# Patient Record
Sex: MALE | Race: OTHER | NOT HISPANIC OR LATINO | URBAN - METROPOLITAN AREA
[De-identification: names, ages, dates, MRNs, and addresses within clinical notes are randomized per-mention and may not be internally consistent; named-entity substitution may affect disease eponyms.]

---

## 2023-11-07 ENCOUNTER — INPATIENT (INPATIENT)
Facility: HOSPITAL | Age: 63
LOS: 6 days | Discharge: HOME | DRG: 330 | End: 2023-11-14
Attending: STUDENT IN AN ORGANIZED HEALTH CARE EDUCATION/TRAINING PROGRAM | Admitting: SURGERY
Payer: MEDICAID

## 2023-11-07 VITALS
DIASTOLIC BLOOD PRESSURE: 92 MMHG | OXYGEN SATURATION: 97 % | WEIGHT: 220.02 LBS | RESPIRATION RATE: 18 BRPM | HEART RATE: 114 BPM | TEMPERATURE: 98 F | SYSTOLIC BLOOD PRESSURE: 153 MMHG

## 2023-11-07 DIAGNOSIS — C18.5 MALIGNANT NEOPLASM OF SPLENIC FLEXURE: ICD-10-CM

## 2023-11-07 DIAGNOSIS — Z91.148 PATIENT'S OTHER NONCOMPLIANCE WITH MEDICATION REGIMEN FOR OTHER REASON: ICD-10-CM

## 2023-11-07 DIAGNOSIS — Q54.1 HYPOSPADIAS, PENILE: ICD-10-CM

## 2023-11-07 DIAGNOSIS — E78.5 HYPERLIPIDEMIA, UNSPECIFIED: ICD-10-CM

## 2023-11-07 DIAGNOSIS — E66.01 MORBID (SEVERE) OBESITY DUE TO EXCESS CALORIES: ICD-10-CM

## 2023-11-07 DIAGNOSIS — I25.10 ATHEROSCLEROTIC HEART DISEASE OF NATIVE CORONARY ARTERY WITHOUT ANGINA PECTORIS: ICD-10-CM

## 2023-11-07 DIAGNOSIS — E11.9 TYPE 2 DIABETES MELLITUS WITHOUT COMPLICATIONS: ICD-10-CM

## 2023-11-07 DIAGNOSIS — C18.7 MALIGNANT NEOPLASM OF SIGMOID COLON: ICD-10-CM

## 2023-11-07 DIAGNOSIS — Z87.891 PERSONAL HISTORY OF NICOTINE DEPENDENCE: ICD-10-CM

## 2023-11-07 DIAGNOSIS — I10 ESSENTIAL (PRIMARY) HYPERTENSION: ICD-10-CM

## 2023-11-07 DIAGNOSIS — R10.9 UNSPECIFIED ABDOMINAL PAIN: ICD-10-CM

## 2023-11-07 DIAGNOSIS — T46.1X6A UNDERDOSING OF CALCIUM-CHANNEL BLOCKERS, INITIAL ENCOUNTER: ICD-10-CM

## 2023-11-07 PROBLEM — C18.9 MALIGNANT NEOPLASM OF COLON, UNSPECIFIED: Chronic | Status: ACTIVE | Noted: 2023-10-25

## 2023-11-07 LAB
ALBUMIN SERPL ELPH-MCNC: 3.5 G/DL — SIGNIFICANT CHANGE UP (ref 3.3–5)
ALBUMIN SERPL ELPH-MCNC: 3.5 G/DL — SIGNIFICANT CHANGE UP (ref 3.3–5)
ALBUMIN SERPL ELPH-MCNC: 4.2 G/DL — SIGNIFICANT CHANGE UP (ref 3.3–5)
ALBUMIN SERPL ELPH-MCNC: 4.2 G/DL — SIGNIFICANT CHANGE UP (ref 3.3–5)
ALP SERPL-CCNC: 82 U/L — SIGNIFICANT CHANGE UP (ref 40–120)
ALP SERPL-CCNC: 82 U/L — SIGNIFICANT CHANGE UP (ref 40–120)
ALP SERPL-CCNC: 99 U/L — SIGNIFICANT CHANGE UP (ref 40–120)
ALP SERPL-CCNC: 99 U/L — SIGNIFICANT CHANGE UP (ref 40–120)
ALT FLD-CCNC: 20 U/L — SIGNIFICANT CHANGE UP (ref 10–45)
ALT FLD-CCNC: 20 U/L — SIGNIFICANT CHANGE UP (ref 10–45)
ALT FLD-CCNC: SIGNIFICANT CHANGE UP U/L (ref 10–45)
ALT FLD-CCNC: SIGNIFICANT CHANGE UP U/L (ref 10–45)
ANION GAP SERPL CALC-SCNC: 10 MMOL/L — SIGNIFICANT CHANGE UP (ref 5–17)
ANION GAP SERPL CALC-SCNC: 10 MMOL/L — SIGNIFICANT CHANGE UP (ref 5–17)
ANION GAP SERPL CALC-SCNC: 12 MMOL/L — SIGNIFICANT CHANGE UP (ref 5–17)
ANION GAP SERPL CALC-SCNC: 12 MMOL/L — SIGNIFICANT CHANGE UP (ref 5–17)
ANISOCYTOSIS BLD QL: SLIGHT — SIGNIFICANT CHANGE UP
ANISOCYTOSIS BLD QL: SLIGHT — SIGNIFICANT CHANGE UP
AST SERPL-CCNC: 19 U/L — SIGNIFICANT CHANGE UP (ref 10–40)
AST SERPL-CCNC: 19 U/L — SIGNIFICANT CHANGE UP (ref 10–40)
AST SERPL-CCNC: SIGNIFICANT CHANGE UP U/L (ref 10–40)
AST SERPL-CCNC: SIGNIFICANT CHANGE UP U/L (ref 10–40)
BASOPHILS # BLD AUTO: 0.1 K/UL — SIGNIFICANT CHANGE UP (ref 0–0.2)
BASOPHILS # BLD AUTO: 0.1 K/UL — SIGNIFICANT CHANGE UP (ref 0–0.2)
BASOPHILS NFR BLD AUTO: 0.9 % — SIGNIFICANT CHANGE UP (ref 0–2)
BASOPHILS NFR BLD AUTO: 0.9 % — SIGNIFICANT CHANGE UP (ref 0–2)
BILIRUB SERPL-MCNC: 0.2 MG/DL — SIGNIFICANT CHANGE UP (ref 0.2–1.2)
BLD GP AB SCN SERPL QL: NEGATIVE — SIGNIFICANT CHANGE UP
BLD GP AB SCN SERPL QL: NEGATIVE — SIGNIFICANT CHANGE UP
BUN SERPL-MCNC: 10 MG/DL — SIGNIFICANT CHANGE UP (ref 7–23)
BUN SERPL-MCNC: 10 MG/DL — SIGNIFICANT CHANGE UP (ref 7–23)
BUN SERPL-MCNC: 11 MG/DL — SIGNIFICANT CHANGE UP (ref 7–23)
BUN SERPL-MCNC: 11 MG/DL — SIGNIFICANT CHANGE UP (ref 7–23)
BURR CELLS BLD QL SMEAR: PRESENT — SIGNIFICANT CHANGE UP
BURR CELLS BLD QL SMEAR: PRESENT — SIGNIFICANT CHANGE UP
CALCIUM SERPL-MCNC: 8.1 MG/DL — LOW (ref 8.4–10.5)
CALCIUM SERPL-MCNC: 8.1 MG/DL — LOW (ref 8.4–10.5)
CALCIUM SERPL-MCNC: 9.4 MG/DL — SIGNIFICANT CHANGE UP (ref 8.4–10.5)
CALCIUM SERPL-MCNC: 9.4 MG/DL — SIGNIFICANT CHANGE UP (ref 8.4–10.5)
CHLORIDE SERPL-SCNC: 102 MMOL/L — SIGNIFICANT CHANGE UP (ref 96–108)
CHLORIDE SERPL-SCNC: 102 MMOL/L — SIGNIFICANT CHANGE UP (ref 96–108)
CHLORIDE SERPL-SCNC: 107 MMOL/L — SIGNIFICANT CHANGE UP (ref 96–108)
CHLORIDE SERPL-SCNC: 107 MMOL/L — SIGNIFICANT CHANGE UP (ref 96–108)
CO2 SERPL-SCNC: 21 MMOL/L — LOW (ref 22–31)
CO2 SERPL-SCNC: 21 MMOL/L — LOW (ref 22–31)
CO2 SERPL-SCNC: 23 MMOL/L — SIGNIFICANT CHANGE UP (ref 22–31)
CO2 SERPL-SCNC: 23 MMOL/L — SIGNIFICANT CHANGE UP (ref 22–31)
CREAT SERPL-MCNC: 0.95 MG/DL — SIGNIFICANT CHANGE UP (ref 0.5–1.3)
CREAT SERPL-MCNC: 0.95 MG/DL — SIGNIFICANT CHANGE UP (ref 0.5–1.3)
CREAT SERPL-MCNC: 1.01 MG/DL — SIGNIFICANT CHANGE UP (ref 0.5–1.3)
CREAT SERPL-MCNC: 1.01 MG/DL — SIGNIFICANT CHANGE UP (ref 0.5–1.3)
EGFR: 84 ML/MIN/1.73M2 — SIGNIFICANT CHANGE UP
EGFR: 84 ML/MIN/1.73M2 — SIGNIFICANT CHANGE UP
EGFR: 90 ML/MIN/1.73M2 — SIGNIFICANT CHANGE UP
EGFR: 90 ML/MIN/1.73M2 — SIGNIFICANT CHANGE UP
ELLIPTOCYTES BLD QL SMEAR: SLIGHT — SIGNIFICANT CHANGE UP
ELLIPTOCYTES BLD QL SMEAR: SLIGHT — SIGNIFICANT CHANGE UP
EOSINOPHIL # BLD AUTO: 0 K/UL — SIGNIFICANT CHANGE UP (ref 0–0.5)
EOSINOPHIL # BLD AUTO: 0 K/UL — SIGNIFICANT CHANGE UP (ref 0–0.5)
EOSINOPHIL NFR BLD AUTO: 0 % — SIGNIFICANT CHANGE UP (ref 0–6)
EOSINOPHIL NFR BLD AUTO: 0 % — SIGNIFICANT CHANGE UP (ref 0–6)
GLUCOSE BLDC GLUCOMTR-MCNC: 109 MG/DL — HIGH (ref 70–99)
GLUCOSE BLDC GLUCOMTR-MCNC: 109 MG/DL — HIGH (ref 70–99)
GLUCOSE BLDC GLUCOMTR-MCNC: 97 MG/DL — SIGNIFICANT CHANGE UP (ref 70–99)
GLUCOSE BLDC GLUCOMTR-MCNC: 97 MG/DL — SIGNIFICANT CHANGE UP (ref 70–99)
GLUCOSE SERPL-MCNC: 105 MG/DL — HIGH (ref 70–99)
GLUCOSE SERPL-MCNC: 105 MG/DL — HIGH (ref 70–99)
GLUCOSE SERPL-MCNC: 122 MG/DL — HIGH (ref 70–99)
GLUCOSE SERPL-MCNC: 122 MG/DL — HIGH (ref 70–99)
HCT VFR BLD CALC: 46 % — SIGNIFICANT CHANGE UP (ref 39–50)
HCT VFR BLD CALC: 46 % — SIGNIFICANT CHANGE UP (ref 39–50)
HGB BLD-MCNC: 14.3 G/DL — SIGNIFICANT CHANGE UP (ref 13–17)
HGB BLD-MCNC: 14.3 G/DL — SIGNIFICANT CHANGE UP (ref 13–17)
LIDOCAIN IGE QN: 27 U/L — SIGNIFICANT CHANGE UP (ref 7–60)
LIDOCAIN IGE QN: 27 U/L — SIGNIFICANT CHANGE UP (ref 7–60)
LYMPHOCYTES # BLD AUTO: 19.5 % — SIGNIFICANT CHANGE UP (ref 13–44)
LYMPHOCYTES # BLD AUTO: 19.5 % — SIGNIFICANT CHANGE UP (ref 13–44)
LYMPHOCYTES # BLD AUTO: 2.14 K/UL — SIGNIFICANT CHANGE UP (ref 1–3.3)
LYMPHOCYTES # BLD AUTO: 2.14 K/UL — SIGNIFICANT CHANGE UP (ref 1–3.3)
MAGNESIUM SERPL-MCNC: 2.1 MG/DL — SIGNIFICANT CHANGE UP (ref 1.6–2.6)
MAGNESIUM SERPL-MCNC: 2.1 MG/DL — SIGNIFICANT CHANGE UP (ref 1.6–2.6)
MANUAL SMEAR VERIFICATION: SIGNIFICANT CHANGE UP
MANUAL SMEAR VERIFICATION: SIGNIFICANT CHANGE UP
MCHC RBC-ENTMCNC: 25.4 PG — LOW (ref 27–34)
MCHC RBC-ENTMCNC: 25.4 PG — LOW (ref 27–34)
MCHC RBC-ENTMCNC: 31.1 GM/DL — LOW (ref 32–36)
MCHC RBC-ENTMCNC: 31.1 GM/DL — LOW (ref 32–36)
MCV RBC AUTO: 81.6 FL — SIGNIFICANT CHANGE UP (ref 80–100)
MCV RBC AUTO: 81.6 FL — SIGNIFICANT CHANGE UP (ref 80–100)
MICROCYTES BLD QL: SLIGHT — SIGNIFICANT CHANGE UP
MICROCYTES BLD QL: SLIGHT — SIGNIFICANT CHANGE UP
MONOCYTES # BLD AUTO: 0.87 K/UL — SIGNIFICANT CHANGE UP (ref 0–0.9)
MONOCYTES # BLD AUTO: 0.87 K/UL — SIGNIFICANT CHANGE UP (ref 0–0.9)
MONOCYTES NFR BLD AUTO: 7.9 % — SIGNIFICANT CHANGE UP (ref 2–14)
MONOCYTES NFR BLD AUTO: 7.9 % — SIGNIFICANT CHANGE UP (ref 2–14)
NEUTROPHILS # BLD AUTO: 7.87 K/UL — HIGH (ref 1.8–7.4)
NEUTROPHILS # BLD AUTO: 7.87 K/UL — HIGH (ref 1.8–7.4)
NEUTROPHILS NFR BLD AUTO: 71.7 % — SIGNIFICANT CHANGE UP (ref 43–77)
NEUTROPHILS NFR BLD AUTO: 71.7 % — SIGNIFICANT CHANGE UP (ref 43–77)
PLAT MORPH BLD: ABNORMAL
PLAT MORPH BLD: ABNORMAL
PLATELET # BLD AUTO: 306 K/UL — SIGNIFICANT CHANGE UP (ref 150–400)
PLATELET # BLD AUTO: 306 K/UL — SIGNIFICANT CHANGE UP (ref 150–400)
POIKILOCYTOSIS BLD QL AUTO: SIGNIFICANT CHANGE UP
POIKILOCYTOSIS BLD QL AUTO: SIGNIFICANT CHANGE UP
POTASSIUM SERPL-MCNC: 4.2 MMOL/L — SIGNIFICANT CHANGE UP (ref 3.5–5.3)
POTASSIUM SERPL-MCNC: 4.2 MMOL/L — SIGNIFICANT CHANGE UP (ref 3.5–5.3)
POTASSIUM SERPL-MCNC: SIGNIFICANT CHANGE UP MMOL/L (ref 3.5–5.3)
POTASSIUM SERPL-MCNC: SIGNIFICANT CHANGE UP MMOL/L (ref 3.5–5.3)
POTASSIUM SERPL-SCNC: 4.2 MMOL/L — SIGNIFICANT CHANGE UP (ref 3.5–5.3)
POTASSIUM SERPL-SCNC: 4.2 MMOL/L — SIGNIFICANT CHANGE UP (ref 3.5–5.3)
POTASSIUM SERPL-SCNC: SIGNIFICANT CHANGE UP MMOL/L (ref 3.5–5.3)
POTASSIUM SERPL-SCNC: SIGNIFICANT CHANGE UP MMOL/L (ref 3.5–5.3)
PROT SERPL-MCNC: 6.9 G/DL — SIGNIFICANT CHANGE UP (ref 6–8.3)
PROT SERPL-MCNC: 6.9 G/DL — SIGNIFICANT CHANGE UP (ref 6–8.3)
PROT SERPL-MCNC: 8.6 G/DL — HIGH (ref 6–8.3)
PROT SERPL-MCNC: 8.6 G/DL — HIGH (ref 6–8.3)
RBC # BLD: 5.64 M/UL — SIGNIFICANT CHANGE UP (ref 4.2–5.8)
RBC # BLD: 5.64 M/UL — SIGNIFICANT CHANGE UP (ref 4.2–5.8)
RBC # FLD: 23.4 % — HIGH (ref 10.3–14.5)
RBC # FLD: 23.4 % — HIGH (ref 10.3–14.5)
RBC BLD AUTO: ABNORMAL
RBC BLD AUTO: ABNORMAL
RH IG SCN BLD-IMP: POSITIVE — SIGNIFICANT CHANGE UP
SMUDGE CELLS # BLD: PRESENT — SIGNIFICANT CHANGE UP
SMUDGE CELLS # BLD: PRESENT — SIGNIFICANT CHANGE UP
SODIUM SERPL-SCNC: 137 MMOL/L — SIGNIFICANT CHANGE UP (ref 135–145)
SODIUM SERPL-SCNC: 137 MMOL/L — SIGNIFICANT CHANGE UP (ref 135–145)
SODIUM SERPL-SCNC: 138 MMOL/L — SIGNIFICANT CHANGE UP (ref 135–145)
SODIUM SERPL-SCNC: 138 MMOL/L — SIGNIFICANT CHANGE UP (ref 135–145)
SPHEROCYTES BLD QL SMEAR: SLIGHT — SIGNIFICANT CHANGE UP
SPHEROCYTES BLD QL SMEAR: SLIGHT — SIGNIFICANT CHANGE UP
WBC # BLD: 10.98 K/UL — HIGH (ref 3.8–10.5)
WBC # BLD: 10.98 K/UL — HIGH (ref 3.8–10.5)
WBC # FLD AUTO: 10.98 K/UL — HIGH (ref 3.8–10.5)
WBC # FLD AUTO: 10.98 K/UL — HIGH (ref 3.8–10.5)

## 2023-11-07 PROCEDURE — 71045 X-RAY EXAM CHEST 1 VIEW: CPT | Mod: 26

## 2023-11-07 PROCEDURE — 93010 ELECTROCARDIOGRAM REPORT: CPT

## 2023-11-07 PROCEDURE — 99285 EMERGENCY DEPT VISIT HI MDM: CPT

## 2023-11-07 RX ORDER — MORPHINE SULFATE 50 MG/1
4 CAPSULE, EXTENDED RELEASE ORAL ONCE
Refills: 0 | Status: DISCONTINUED | OUTPATIENT
Start: 2023-11-07 | End: 2023-11-07

## 2023-11-07 RX ORDER — DEXTROSE 50 % IN WATER 50 %
15 SYRINGE (ML) INTRAVENOUS ONCE
Refills: 0 | Status: DISCONTINUED | OUTPATIENT
Start: 2023-11-07 | End: 2023-11-09

## 2023-11-07 RX ORDER — HEPARIN SODIUM 5000 [USP'U]/ML
7500 INJECTION INTRAVENOUS; SUBCUTANEOUS EVERY 8 HOURS
Refills: 0 | Status: DISCONTINUED | OUTPATIENT
Start: 2023-11-07 | End: 2023-11-09

## 2023-11-07 RX ORDER — VITAMIN E 100 UNIT
0 CAPSULE ORAL
Refills: 0 | DISCHARGE

## 2023-11-07 RX ORDER — HYDROMORPHONE HYDROCHLORIDE 2 MG/ML
0.25 INJECTION INTRAMUSCULAR; INTRAVENOUS; SUBCUTANEOUS EVERY 4 HOURS
Refills: 0 | Status: DISCONTINUED | OUTPATIENT
Start: 2023-11-07 | End: 2023-11-09

## 2023-11-07 RX ORDER — INSULIN LISPRO 100/ML
VIAL (ML) SUBCUTANEOUS
Refills: 0 | Status: DISCONTINUED | OUTPATIENT
Start: 2023-11-07 | End: 2023-11-09

## 2023-11-07 RX ORDER — SODIUM CHLORIDE 9 MG/ML
1000 INJECTION, SOLUTION INTRAVENOUS
Refills: 0 | Status: DISCONTINUED | OUTPATIENT
Start: 2023-11-07 | End: 2023-11-09

## 2023-11-07 RX ORDER — ONDANSETRON 8 MG/1
4 TABLET, FILM COATED ORAL ONCE
Refills: 0 | Status: COMPLETED | OUTPATIENT
Start: 2023-11-07 | End: 2023-11-07

## 2023-11-07 RX ORDER — HYDROMORPHONE HYDROCHLORIDE 2 MG/ML
0.5 INJECTION INTRAMUSCULAR; INTRAVENOUS; SUBCUTANEOUS EVERY 4 HOURS
Refills: 0 | Status: DISCONTINUED | OUTPATIENT
Start: 2023-11-07 | End: 2023-11-09

## 2023-11-07 RX ORDER — DEXTROSE 50 % IN WATER 50 %
25 SYRINGE (ML) INTRAVENOUS ONCE
Refills: 0 | Status: DISCONTINUED | OUTPATIENT
Start: 2023-11-07 | End: 2023-11-09

## 2023-11-07 RX ORDER — DEXTROSE 50 % IN WATER 50 %
12.5 SYRINGE (ML) INTRAVENOUS ONCE
Refills: 0 | Status: DISCONTINUED | OUTPATIENT
Start: 2023-11-07 | End: 2023-11-09

## 2023-11-07 RX ORDER — ACETAMINOPHEN 500 MG
650 TABLET ORAL EVERY 6 HOURS
Refills: 0 | Status: DISCONTINUED | OUTPATIENT
Start: 2023-11-07 | End: 2023-11-09

## 2023-11-07 RX ORDER — IOHEXOL 300 MG/ML
30 INJECTION, SOLUTION INTRAVENOUS ONCE
Refills: 0 | Status: COMPLETED | OUTPATIENT
Start: 2023-11-07 | End: 2023-11-07

## 2023-11-07 RX ORDER — GLUCAGON INJECTION, SOLUTION 0.5 MG/.1ML
1 INJECTION, SOLUTION SUBCUTANEOUS ONCE
Refills: 0 | Status: DISCONTINUED | OUTPATIENT
Start: 2023-11-07 | End: 2023-11-09

## 2023-11-07 RX ORDER — ONDANSETRON 8 MG/1
4 TABLET, FILM COATED ORAL EVERY 6 HOURS
Refills: 0 | Status: DISCONTINUED | OUTPATIENT
Start: 2023-11-07 | End: 2023-11-09

## 2023-11-07 RX ORDER — INFLUENZA VIRUS VACCINE 15; 15; 15; 15 UG/.5ML; UG/.5ML; UG/.5ML; UG/.5ML
0.5 SUSPENSION INTRAMUSCULAR ONCE
Refills: 0 | Status: DISCONTINUED | OUTPATIENT
Start: 2023-11-07 | End: 2023-11-14

## 2023-11-07 RX ORDER — PANTOPRAZOLE SODIUM 20 MG/1
40 TABLET, DELAYED RELEASE ORAL DAILY
Refills: 0 | Status: DISCONTINUED | OUTPATIENT
Start: 2023-11-07 | End: 2023-11-09

## 2023-11-07 RX ORDER — HEPARIN SODIUM 5000 [USP'U]/ML
7500 INJECTION INTRAVENOUS; SUBCUTANEOUS EVERY 8 HOURS
Refills: 0 | Status: DISCONTINUED | OUTPATIENT
Start: 2023-11-07 | End: 2023-11-07

## 2023-11-07 RX ORDER — HEPARIN SODIUM 5000 [USP'U]/ML
7500 INJECTION INTRAVENOUS; SUBCUTANEOUS ONCE
Refills: 0 | Status: DISCONTINUED | OUTPATIENT
Start: 2023-11-07 | End: 2023-11-07

## 2023-11-07 RX ORDER — SODIUM CHLORIDE 9 MG/ML
1000 INJECTION INTRAMUSCULAR; INTRAVENOUS; SUBCUTANEOUS ONCE
Refills: 0 | Status: COMPLETED | OUTPATIENT
Start: 2023-11-07 | End: 2023-11-07

## 2023-11-07 RX ADMIN — MORPHINE SULFATE 4 MILLIGRAM(S): 50 CAPSULE, EXTENDED RELEASE ORAL at 13:24

## 2023-11-07 RX ADMIN — ONDANSETRON 4 MILLIGRAM(S): 8 TABLET, FILM COATED ORAL at 12:54

## 2023-11-07 RX ADMIN — HEPARIN SODIUM 7500 UNIT(S): 5000 INJECTION INTRAVENOUS; SUBCUTANEOUS at 21:52

## 2023-11-07 RX ADMIN — SODIUM CHLORIDE 1000 MILLILITER(S): 9 INJECTION INTRAMUSCULAR; INTRAVENOUS; SUBCUTANEOUS at 12:54

## 2023-11-07 RX ADMIN — IOHEXOL 30 MILLILITER(S): 300 INJECTION, SOLUTION INTRAVENOUS at 12:54

## 2023-11-07 RX ADMIN — MORPHINE SULFATE 4 MILLIGRAM(S): 50 CAPSULE, EXTENDED RELEASE ORAL at 12:54

## 2023-11-07 NOTE — H&P ADULT - NSHPPHYSICALEXAM_GEN_ALL_CORE
CONSTITUTIONAL: Awake, alert.  Nontoxic, no acute distress.  HEAD: Normocephalic, atraumatic.  EYES: Conjunctivae clear without exudates or hemorrhage. Sclera is non-icteric.  ENT: Normal appearing external ears, nose, mucous membranes moist.  NECK: supple, trachea midline.  HEART:  Normal rate, regular rhythm.    LUNGS:  No acute respiratory distress.  Non-tachypneic and non-labored.  ABDOMEN: soft, morbidly obese, non distended, mildly tender to palpation in left hemiabdomen, there is absence of rebound, guarding.   MUSCULOSKELETAL:  Moving all extremities without issue.  SKIN: Skin in warm, dry and intact without rashes or lesions.  Appropriate color for ethnicity.  NEUROLOGICAL:  Patient is alert, oriented x person, place and time.  PSYCH: Appropriate mood and affect. Good judgment and insight.

## 2023-11-07 NOTE — PATIENT PROFILE ADULT - HOW PATIENT ADDRESSED, PROFILE
Alana Talbot, MEGHANN, LD, [facility]     Time spent: 9 minutes  
Mr. Liao

## 2023-11-07 NOTE — H&P ADULT - ASSESSMENT
63 year old male with know history of splenic flexure and descending colon adenocarcinoma (Aug 2023 by colonoscopy), HTN, HLD, DMII, morbid obesity referred to [facility] ED by PCP for increasing abdominal pain, bloody bowel movements, increasing abdominal bloating and distension. Clinical findings, along with patient history and outpatient work up consistent with acute presentation of increasing obstructive symptoms from splenic flexure colon adenocarcinoma.     Plan     Admit to Dr. Irving [facility]   Plan for extended left hemicolectomy on 11/9 -   Clear liquid diet   SQH/ OOBA/ SCDs   Bowel prep 11/8 pending OR   AM labs ( ensure active type and screen)   Hold 2 PRBC pending procedure   Dr. Irving and Chief resident Daly Curtis aware and agree with plans

## 2023-11-07 NOTE — PATIENT PROFILE ADULT - NSTRANSFERBELONGINGSDISPO_GEN_A_NUR
Medication:   Requested Prescriptions     Pending Prescriptions Disp Refills    pregabalin (LYRICA) 25 MG capsule 90 capsule 0     Sig: Take 1 capsule by mouth 3 times daily for 30 days. Last Filled:  09/09/21    Patient Phone Number: [phone] (home)     Last appt: 8/13/2021 Return in about 4 weeks (around 9/10/2021). Next appt: Visit date not found    Last OARRS: No flowsheet data found.
with patient

## 2023-11-07 NOTE — ED ADULT NURSE NOTE - OBJECTIVE STATEMENT
Pt A&Ox4 and able to and able to speak in complete sentences. Pt breathing even and unlabored with equal chest rise and fall. Pt endorsing abd pain for the past couple months and reported work up outpatient. Pt endorsed bloody stools. Pt denies cp, n, v, lightheadedness, dizziness, sob, fevers, chills. Pt abd pain located in BLQ. Pt abd tender to palpation. Pt able to ambulate with steady gait.

## 2023-11-07 NOTE — ED ADULT NURSE NOTE - NSFALLUNIVINTERV_ED_ALL_ED
Bed/Stretcher in lowest position, wheels locked, appropriate side rails in place/Call bell, personal items and telephone in reach/Instruct patient to call for assistance before getting out of bed/chair/stretcher/Non-slip footwear applied when patient is off stretcher/[place] to call system/Physically safe environment - no spills, clutter or unnecessary equipment/Purposeful proactive rounding/Room/bathroom lighting operational, light cord in reach

## 2023-11-07 NOTE — ED PROVIDER NOTE - OBJECTIVE STATEMENT
64 yo M with PMH of CAD, HTN, HLD, DM,  with know history of splenic flexure and descending colon adenocarcinoma (Aug 2023 by colonoscopy) know to Dr. Montague, HTN, HLD, DMII ?, morbid obesity referred to [facility] ED by PCP for increasing abdominal pain, bloody bowel movements, increasing abdominal bloating and distension. Patient refers that over the past couple of days he has been experiencing crampy lower abdominal pain with increasing feeling of bloating. Refers that he was being worked up for colon malignancy after he started to notice blood on the toilet bowl after bowel movements and while wiping. Today he refers intermittent, dull lower abdominal pain not associated with nausea, vomiting, blood per rectum, melena. Denies any chest pain, shortness of breathe, cough, urinary symptoms.     In the ED patient seen in no acute distress, with complaints of mild abdominal pain.   On exam, vital signs are stable, abdomen is soft, morbidly obese, non distended, mildly tender to palpation in left hemiabdomen, there is absence of rebound, guarding.   Lab values within normal limits     PMH: descending colon adenocarcinoma, HTN, HLD, DMII ?  PSH: denies  Social: Former smoker (quit 8 years ago)  Allergies: denies	  Fam: No family history of cancer 		  Scopes: C-scope 2 months ago - dx of colon cancer   Meds:  Vit D 62 yo M with PMH of HTN, HLD, DM, CAD with known history of splenic flexure and descending colon adenocarcinoma (Aug 2023 by colonoscopy), known to Dr. Montague, referred to [facility] ED by PCP for increasing abdominal pain, bloody bowel movements, increasing abdominal bloating and distension. Patient states that over the past couple of days he has been experiencing crampy lower abdominal pain with increasing feeling of bloating. Refers that he was being worked up for colon malignancy after he started to notice blood on the toilet bowl after bowel movements and while wiping. Today he refers intermittent, dull lower abdominal pain not associated with nausea, vomiting, blood per rectum, melena. Denies any chest pain, shortness of breathe, cough, urinary symptoms.

## 2023-11-07 NOTE — H&P ADULT - NSICDXPASTMEDICALHX_GEN_ALL_CORE_FT
PAST MEDICAL HISTORY:  CAD (coronary artery disease)     DMII (diabetes mellitus, type 2)     HLD (hyperlipidemia)     HTN (hypertension)     Malignant neoplasm of colon

## 2023-11-07 NOTE — H&P ADULT - HISTORY OF PRESENT ILLNESS
63 year old male with know history of splenic flexure and descending colon adenocarcinoma (Aug 2023 by colonoscopy) know to Dr. Montague, HTN, HLD, DMII, morbid obesity referred to [facility] ED by PCP for increasing abdominal pain, bloody bowel movements, increasing abdominal bloating and distension. Patient refers that over the past couple of days he has been experiencing crampy lower abdominal pain with increasing feeling of bloating. Refers that he was being worked up for colon malignancy after he started to notice blood on the toilet bowl after bowel movements and while wiping. Today he refers intermittent, dull lower abdominal pain not associated with nausea, vomiting, blood per rectum, melena. Denies any chest pain, shortness of breathe, cough, urinary symptoms.     In the ED patient seen in no acute distress, with complaints of mild abdominal pain.   On exam, vital signs are stable, abdomen is soft, morbidly obese, non distended, mildly tender to palpation in left hemiabdomen, there is absence of rebound, guarding.   Lab values within normal limits     PMH: descending colon adenocarcinoma, HTN, HLD, CAD, DMII  PSH: denies  Social: Former smoker (quit 8 years ago)  Allergies: denies	  Fam: No family history of cancer 		  Scopes: C-scope 2 months ago - dx of colon cancer   Meds:  Vit D  63 year old male with know history of splenic flexure and descending colon adenocarcinoma (Aug 2023 by colonoscopy) know to Dr. Montague, HTN, HLD, DMII ?, morbid obesity referred to [facility] ED by PCP for increasing abdominal pain, bloody bowel movements, increasing abdominal bloating and distension. Patient refers that over the past couple of days he has been experiencing crampy lower abdominal pain with increasing feeling of bloating. Refers that he was being worked up for colon malignancy after he started to notice blood on the toilet bowl after bowel movements and while wiping. Today he refers intermittent, dull lower abdominal pain not associated with nausea, vomiting, blood per rectum, melena. Denies any chest pain, shortness of breathe, cough, urinary symptoms.     In the ED patient seen in no acute distress, with complaints of mild abdominal pain.   On exam, vital signs are stable, abdomen is soft, morbidly obese, non distended, mildly tender to palpation in left hemiabdomen, there is absence of rebound, guarding.   Lab values within normal limits     PMH: descending colon adenocarcinoma, HTN, HLD, DMII ?  PSH: denies  Social: Former smoker (quit 8 years ago)  Allergies: denies	  Fam: No family history of cancer 		  Scopes: C-scope 2 months ago - dx of colon cancer   Meds:  Vit D

## 2023-11-07 NOTE — ED PROVIDER NOTE - CLINICAL SUMMARY MEDICAL DECISION MAKING FREE TEXT BOX
63 year old male with know history of splenic flexure and descending colon adenocarcinoma (Aug 2023 by colonoscopy) know to Dr. Montague, HTN, HLD, DMII ?, morbid obesity referred to [facility] ED by PCP for increasing abdominal pain, bloody bowel movements, increasing abdominal bloating and distension. Patient refers that over the past couple of days he has been experiencing crampy lower abdominal pain with increasing feeling of bloating. Refers that he was being worked up for colon malignancy after he started to notice blood on the toilet bowl after bowel movements and while wiping. Today he refers intermittent, dull lower abdominal pain not associated with nausea, vomiting, blood per rectum, melena. Denies any chest pain, shortness of breathe, cough, urinary symptoms.     In the ED patient seen in no acute distress, with complaints of mild abdominal pain.   On exam, vital signs are stable, abdomen is soft, morbidly obese, non distended, mildly tender to palpation in left hemiabdomen, there is absence of rebound, guarding.   Lab values within normal limits     PMH: descending colon adenocarcinoma, HTN, HLD, DMII ?  PSH: denies  Social: Former smoker (quit 8 years ago)  Allergies: denies	  Fam: No family history of cancer 		  Scopes: C-scope 2 months ago - dx of colon cancer   Meds:  Vit D 62 yo M with PMH of HTN, HLD, DM, CAD with known history of splenic flexure and descending colon adenocarcinoma (Aug 2023 by colonoscopy), known to Dr. Montague, referred to [facility] ED by PCP for increasing abdominal pain, bloody bowel movements, increasing abdominal bloating and distension. Patient states that over the past couple of days he has been experiencing crampy lower abdominal pain with increasing feeling of bloating. Refers that he was being worked up for colon malignancy after he started to notice blood on the toilet bowl after bowel movements and while wiping. Today he refers intermittent, dull lower abdominal pain not associated with nausea, vomiting, blood per rectum, melena. Denies any chest pain, shortness of breathe, cough, urinary symptoms.    ED course: VS noted. Pt afebrile, tachycardic and mildly hypertensive. Abdomen is soft and NT. Labs noted and unremarkable. Case discussed with Surgery and pt admitted to Surgery. Stable in ED. 62 yo M with PMH of HTN, HLD, DM, CAD with known history of splenic flexure and descending colon adenocarcinoma (Aug 2023 by colonoscopy), known to Dr. Montague, referred to [facility] ED by PCP for increasing abdominal pain, bloody bowel movements, increasing abdominal bloating and distension. Patient states that over the past couple of days he has been experiencing crampy lower abdominal pain with increasing feeling of bloating. Refers that he was being worked up for colon malignancy after he started to notice blood on the toilet bowl after bowel movements and while wiping. Today he refers intermittent, dull lower abdominal pain not associated with nausea, vomiting, blood per rectum, melena. Denies any chest pain, shortness of breathe, cough, urinary symptoms.    ED course: VS noted. Pt afebrile, tachycardic and mildly hypertensive. Abdomen is soft and NT. Labs noted. Case discussed with Surgery and patient admitted to Surgery. Stable in ED.

## 2023-11-07 NOTE — H&P ADULT - NSHPLABSRESULTS_GEN_ALL_CORE
LABS:                        14.3   10.98 )-----------( 306      ( 07 Nov 2023 12:13 )             46.0     11-07    137  |  102  |  11  ----------------------------<  122<H>  SEE NOTE   |  23  |  1.01    Ca    9.4      07 Nov 2023 12:13  Mg     2.1     11-07    TPro  8.6<H>  /  Alb  4.2  /  TBili  0.2  /  DBili  x   /  AST  SEE NOTE  /  ALT  SEE NOTE  /  AlkPhos  99  11-07      Urinalysis Basic - ( 07 Nov 2023 12:13 )    Color: x / Appearance: x / SG: x / pH: x  Gluc: 122 mg/dL / Ketone: x  / Bili: x / Urobili: x   Blood: x / Protein: x / Nitrite: x   Leuk Esterase: x / RBC: x / WBC x   Sq Epi: x / Non Sq Epi: x / Bacteria: x        RADIOLOGY AND ADDITIONAL TESTS: Reviewed

## 2023-11-07 NOTE — ED PROVIDER NOTE - NS ED MD DISPO DIVISION
Aortic diameter per CT   11/9/19 Annulus 4.0cm sinuses 4.5cm STJ 3.7cm transverse arch 3.5cm    8/18/21 asc 4.3cm    11/22/22  asc 3.8cm mArch 3.3cm pDesc 2.8cm dDesc 2.7cm                           Message to Pt, RF's requested too soon. Is she out of medications for some reason? Awaiting reply.   [facility]

## 2023-11-08 LAB
ANION GAP SERPL CALC-SCNC: 10 MMOL/L — SIGNIFICANT CHANGE UP (ref 5–17)
ANION GAP SERPL CALC-SCNC: 10 MMOL/L — SIGNIFICANT CHANGE UP (ref 5–17)
BUN SERPL-MCNC: 8 MG/DL — SIGNIFICANT CHANGE UP (ref 7–23)
BUN SERPL-MCNC: 8 MG/DL — SIGNIFICANT CHANGE UP (ref 7–23)
CALCIUM SERPL-MCNC: 8.4 MG/DL — SIGNIFICANT CHANGE UP (ref 8.4–10.5)
CALCIUM SERPL-MCNC: 8.4 MG/DL — SIGNIFICANT CHANGE UP (ref 8.4–10.5)
CHLORIDE SERPL-SCNC: 107 MMOL/L — SIGNIFICANT CHANGE UP (ref 96–108)
CHLORIDE SERPL-SCNC: 107 MMOL/L — SIGNIFICANT CHANGE UP (ref 96–108)
CO2 SERPL-SCNC: 23 MMOL/L — SIGNIFICANT CHANGE UP (ref 22–31)
CO2 SERPL-SCNC: 23 MMOL/L — SIGNIFICANT CHANGE UP (ref 22–31)
CREAT SERPL-MCNC: 1 MG/DL — SIGNIFICANT CHANGE UP (ref 0.5–1.3)
CREAT SERPL-MCNC: 1 MG/DL — SIGNIFICANT CHANGE UP (ref 0.5–1.3)
EGFR: 85 ML/MIN/1.73M2 — SIGNIFICANT CHANGE UP
EGFR: 85 ML/MIN/1.73M2 — SIGNIFICANT CHANGE UP
GLUCOSE BLDC GLUCOMTR-MCNC: 109 MG/DL — HIGH (ref 70–99)
GLUCOSE BLDC GLUCOMTR-MCNC: 109 MG/DL — HIGH (ref 70–99)
GLUCOSE BLDC GLUCOMTR-MCNC: 91 MG/DL — SIGNIFICANT CHANGE UP (ref 70–99)
GLUCOSE BLDC GLUCOMTR-MCNC: 91 MG/DL — SIGNIFICANT CHANGE UP (ref 70–99)
GLUCOSE BLDC GLUCOMTR-MCNC: 98 MG/DL — SIGNIFICANT CHANGE UP (ref 70–99)
GLUCOSE SERPL-MCNC: 107 MG/DL — HIGH (ref 70–99)
GLUCOSE SERPL-MCNC: 107 MG/DL — HIGH (ref 70–99)
HCT VFR BLD CALC: 40.1 % — SIGNIFICANT CHANGE UP (ref 39–50)
HCT VFR BLD CALC: 40.1 % — SIGNIFICANT CHANGE UP (ref 39–50)
HGB BLD-MCNC: 12.6 G/DL — LOW (ref 13–17)
HGB BLD-MCNC: 12.6 G/DL — LOW (ref 13–17)
MAGNESIUM SERPL-MCNC: 2.4 MG/DL — SIGNIFICANT CHANGE UP (ref 1.6–2.6)
MAGNESIUM SERPL-MCNC: 2.4 MG/DL — SIGNIFICANT CHANGE UP (ref 1.6–2.6)
MCHC RBC-ENTMCNC: 25.9 PG — LOW (ref 27–34)
MCHC RBC-ENTMCNC: 25.9 PG — LOW (ref 27–34)
MCHC RBC-ENTMCNC: 31.4 GM/DL — LOW (ref 32–36)
MCHC RBC-ENTMCNC: 31.4 GM/DL — LOW (ref 32–36)
MCV RBC AUTO: 82.3 FL — SIGNIFICANT CHANGE UP (ref 80–100)
MCV RBC AUTO: 82.3 FL — SIGNIFICANT CHANGE UP (ref 80–100)
NRBC # BLD: 0 /100 WBCS — SIGNIFICANT CHANGE UP (ref 0–0)
NRBC # BLD: 0 /100 WBCS — SIGNIFICANT CHANGE UP (ref 0–0)
PHOSPHATE SERPL-MCNC: 3.4 MG/DL — SIGNIFICANT CHANGE UP (ref 2.5–4.5)
PHOSPHATE SERPL-MCNC: 3.4 MG/DL — SIGNIFICANT CHANGE UP (ref 2.5–4.5)
PLATELET # BLD AUTO: 282 K/UL — SIGNIFICANT CHANGE UP (ref 150–400)
PLATELET # BLD AUTO: 282 K/UL — SIGNIFICANT CHANGE UP (ref 150–400)
POTASSIUM SERPL-MCNC: 4.2 MMOL/L — SIGNIFICANT CHANGE UP (ref 3.5–5.3)
POTASSIUM SERPL-MCNC: 4.2 MMOL/L — SIGNIFICANT CHANGE UP (ref 3.5–5.3)
POTASSIUM SERPL-SCNC: 4.2 MMOL/L — SIGNIFICANT CHANGE UP (ref 3.5–5.3)
POTASSIUM SERPL-SCNC: 4.2 MMOL/L — SIGNIFICANT CHANGE UP (ref 3.5–5.3)
RBC # BLD: 4.87 M/UL — SIGNIFICANT CHANGE UP (ref 4.2–5.8)
RBC # BLD: 4.87 M/UL — SIGNIFICANT CHANGE UP (ref 4.2–5.8)
RBC # FLD: 23.1 % — HIGH (ref 10.3–14.5)
RBC # FLD: 23.1 % — HIGH (ref 10.3–14.5)
SODIUM SERPL-SCNC: 140 MMOL/L — SIGNIFICANT CHANGE UP (ref 135–145)
SODIUM SERPL-SCNC: 140 MMOL/L — SIGNIFICANT CHANGE UP (ref 135–145)
WBC # BLD: 7.6 K/UL — SIGNIFICANT CHANGE UP (ref 3.8–10.5)
WBC # BLD: 7.6 K/UL — SIGNIFICANT CHANGE UP (ref 3.8–10.5)
WBC # FLD AUTO: 7.6 K/UL — SIGNIFICANT CHANGE UP (ref 3.8–10.5)
WBC # FLD AUTO: 7.6 K/UL — SIGNIFICANT CHANGE UP (ref 3.8–10.5)

## 2023-11-08 RX ORDER — SOD SULF/SODIUM/NAHCO3/KCL/PEG
4000 SOLUTION, RECONSTITUTED, ORAL ORAL ONCE
Refills: 0 | Status: COMPLETED | OUTPATIENT
Start: 2023-11-08 | End: 2023-11-08

## 2023-11-08 RX ORDER — SODIUM CHLORIDE 9 MG/ML
1000 INJECTION, SOLUTION INTRAVENOUS
Refills: 0 | Status: DISCONTINUED | OUTPATIENT
Start: 2023-11-08 | End: 2023-11-09

## 2023-11-08 RX ORDER — NEOMYCIN SULFATE 500 MG/1
1000 TABLET ORAL
Refills: 0 | Status: COMPLETED | OUTPATIENT
Start: 2023-11-08 | End: 2023-11-08

## 2023-11-08 RX ORDER — METRONIDAZOLE 500 MG
500 TABLET ORAL
Refills: 0 | Status: COMPLETED | OUTPATIENT
Start: 2023-11-08 | End: 2023-11-08

## 2023-11-08 RX ADMIN — Medication 500 MILLIGRAM(S): at 18:47

## 2023-11-08 RX ADMIN — HEPARIN SODIUM 7500 UNIT(S): 5000 INJECTION INTRAVENOUS; SUBCUTANEOUS at 21:48

## 2023-11-08 RX ADMIN — Medication 500 MILLIGRAM(S): at 21:46

## 2023-11-08 RX ADMIN — Medication 10 MILLIGRAM(S): at 12:51

## 2023-11-08 RX ADMIN — Medication 500 MILLIGRAM(S): at 16:40

## 2023-11-08 RX ADMIN — Medication 10 MILLIGRAM(S): at 16:09

## 2023-11-08 RX ADMIN — NEOMYCIN SULFATE 1000 MILLIGRAM(S): 500 TABLET ORAL at 21:45

## 2023-11-08 RX ADMIN — PANTOPRAZOLE SODIUM 40 MILLIGRAM(S): 20 TABLET, DELAYED RELEASE ORAL at 12:51

## 2023-11-08 RX ADMIN — NEOMYCIN SULFATE 1000 MILLIGRAM(S): 500 TABLET ORAL at 18:48

## 2023-11-08 RX ADMIN — HEPARIN SODIUM 7500 UNIT(S): 5000 INJECTION INTRAVENOUS; SUBCUTANEOUS at 06:16

## 2023-11-08 RX ADMIN — Medication 4000 MILLILITER(S): at 12:52

## 2023-11-08 RX ADMIN — NEOMYCIN SULFATE 1000 MILLIGRAM(S): 500 TABLET ORAL at 16:08

## 2023-11-08 RX ADMIN — HEPARIN SODIUM 7500 UNIT(S): 5000 INJECTION INTRAVENOUS; SUBCUTANEOUS at 16:08

## 2023-11-08 NOTE — PROGRESS NOTE ADULT - ASSESSMENT
62yo male with known hx of splenic flexure and descending colon adenocarcinoma (8/2023 by colonoscopy), HTN, HLD, DMII, morbid obesity referred to [facility] ED by PCP for increasing abdominal pain, bloody bowel movements, increasing abdominal bloating and distension. Patient refers that over the past couple of days he has been experiencing crampy lower abdominal pain with increasing feeling of bloating. States he was being worked up for colon malignancy after he started to notice blood on the toilet after bowel movements and while wiping. Today he refers intermittent, dull lower abdominal pain not associated with nausea, vomiting, blood per rectum, melena. Plan for OR 11/9      Bowel prep for OR 11/9  CLD/IVF  SQH/SCDs  OOB  am labs

## 2023-11-08 NOTE — CONSULT NOTE ADULT - ASSESSMENT
63 year old male with know history of splenic flexure and descending colon adenocarcinoma (Aug 2023 by colonoscopy) know to Dr. Montague, HTN, HLD, DMII, morbid obesity .....  presented after evaluation by PCP due to complaints of dyspepsia, hematochezia, and abdominal pain with bloating; patient was in process of colon cancer evaluation. Now has c/o abdominal pain, BRBPR, hematochezia persists         Home Medications:  vitamin E d-alpha 400 intl units oral capsule: orally (07 Nov 2023 13:55)   63 year old male with know history of splenic flexure and descending colon adenocarcinoma (Aug 2023 by colonoscopy) know to Dr. Montague, HTN morbid obesity presented after evaluation by PCP due to complaints of dyspepsia, hematochezia, and abdominal pain with bloating; patient was in process of colon cancer evaluation. Now has c/o abdominal pain, BRBPR, hematochezia persists     patient non compliant with anti HTN medications, if needed can place on norvasc with holding parameters post op   CXR unremarkable, labs/ EKG reviewed  patient with METS > 4, no SOB / CP with moderate daily activities   patient is a call II RCI score risk     63 year old male with know history of splenic flexure and descending colon adenocarcinoma (Aug 2023 by colonoscopy) know to Dr. Montague, HTN morbid obesity presented after evaluation by PCP due to complaints of dyspepsia, hematochezia, and abdominal pain with bloating; patient was in process of colon cancer evaluation. Now has c/o abdominal pain, BRBPR, hematochezia persists     patient non compliant with anti HTN medications, if needed can place on norvasc with holding parameters post op   CXR unremarkable, labs/ EKG reviewed  patient with METS > 4, no SOB / CP with moderate daily activities   [place] cardiac risk of 0.2%   patient is a class III RCI score risk, 2 points for elevated risk surgery, Q Waves present on EKG

## 2023-11-08 NOTE — CONSULT NOTE ADULT - SUBJECTIVE AND OBJECTIVE BOX
HPI: HPI:  63 year old male with know history of splenic flexure and descending colon adenocarcinoma (Aug 2023 by colonoscopy) know to Dr. Montague, HTN, HLD, DMII, morbid obesity .....  presented after evaluation by PCP due to complaints of dyspepsia, hematochezia, and abdominal pain with bloating; patient was in process of colon cancer evaluation. Now has c/o abdominal pain, BRBPR, hematochezia persists       Denies any chest pain, shortness of breathe, cough, urinary symptoms.         PMH: descending colon adenocarcinoma, HTN, HLD, CAD, DMII  PSH: denies  Social: Former smoker (quit 8 years ago)  Allergies: denies	  Fam: No family history of cancer 		  Scopes: C-scope 2 months ago - dx of colon cancer   Meds:  Vit D  (07 Nov 2023 13:54)        PAST MEDICAL & SURGICAL HISTORY:  Malignant neoplasm of colon      HTN (hypertension)      DMII (diabetes mellitus, type 2)      CAD (coronary artery disease)      HLD (hyperlipidemia)      No significant past surgical history          Allergies    No Known Allergies    Intolerances        MEDICATIONS  (STANDING):  bisacodyl Suppository 10 milliGRAM(s) Rectal once  dextrose 5%. 1000 milliLiter(s) (50 mL/Hr) IV Continuous <Continuous>  dextrose 5%. 1000 milliLiter(s) (100 mL/Hr) IV Continuous <Continuous>  dextrose 50% Injectable 12.5 Gram(s) IV Push once  dextrose 50% Injectable 25 Gram(s) IV Push once  dextrose 50% Injectable 25 Gram(s) IV Push once  glucagon  Injectable 1 milliGRAM(s) IntraMuscular once  heparin   Injectable 7500 Unit(s) SubCutaneous every 8 hours  influenza   Vaccine 0.5 milliLiter(s) IntraMuscular once  insulin lispro (ADMELOG) corrective regimen sliding scale   SubCutaneous Before meals and at bedtime  lactated ringers. 1000 milliLiter(s) (120 mL/Hr) IV Continuous <Continuous>  metroNIDAZOLE    Tablet 500 milliGRAM(s) Oral <User Schedule>  neomycin 1000 milliGRAM(s) Oral <User Schedule>  pantoprazole  Injectable 40 milliGRAM(s) IV Push daily    MEDICATIONS  (PRN):  acetaminophen     Tablet .. 650 milliGRAM(s) Oral every 6 hours PRN Mild Pain (1 - 3)  dextrose Oral Gel 15 Gram(s) Oral once PRN Blood Glucose LESS THAN 70 milliGRAM(s)/deciliter  HYDROmorphone  Injectable 0.5 milliGRAM(s) IV Push every 4 hours PRN Severe Pain (7 - 10)  HYDROmorphone  Injectable 0.25 milliGRAM(s) IV Push every 4 hours PRN Moderate Pain (4 - 6)  ondansetron Injectable 4 milliGRAM(s) IV Push every 6 hours PRN Nausea      SOCIAL HISTORY:    FAMILY HISTORY:  No pertinent family history in first degree relatives          Vital Signs Last 24 Hrs  T(C): 36.6 (08 Nov 2023 13:56), Max: 36.9 (07 Nov 2023 16:15)  T(F): 97.9 (08 Nov 2023 13:56), Max: 98.4 (07 Nov 2023 16:15)  HR: 76 (08 Nov 2023 13:56) (63 - 88)  BP: 149/78 (08 Nov 2023 13:56) (133/75 - 162/83)  BP(mean): --  RR: 17 (08 Nov 2023 13:56) (14 - 18)  SpO2: 97% (08 Nov 2023 13:56) (95% - 98%)    Parameters below as of 08 Nov 2023 13:56  Patient On (Oxygen Delivery Method): room air        I&O's Summary    07 Nov 2023 07:01  -  08 Nov 2023 07:00  --------------------------------------------------------  IN: 0 mL / OUT: 650 mL / NET: -650 mL    08 Nov 2023 07:01  -  08 Nov 2023 14:28  --------------------------------------------------------  IN: 230 mL / OUT: 0 mL / NET: 230 mL        LABS:                        12.6   7.60  )-----------( 282      ( 08 Nov 2023 05:30 )             40.1     11-08    140  |  107  |  8   ----------------------------<  107<H>  4.2   |  23  |  1.00    Ca    8.4      08 Nov 2023 05:30  Phos  3.4     11-08  Mg     2.4     11-08    TPro  6.9  /  Alb  3.5  /  TBili  0.2  /  DBili  x   /  AST  19  /  ALT  20  /  AlkPhos  82  11-07    LIVER FUNCTIONS - ( 07 Nov 2023 13:48 )  Alb: 3.5 g/dL / Pro: 6.9 g/dL / ALK PHOS: 82 U/L / ALT: 20 U/L / AST: 19 U/L / GGT: x           PT/INR - ( 07 Nov 2023 12:13 )   PT: 11.8 sec;   INR: 1.04          PTT - ( 07 Nov 2023 12:13 )  PTT:29.3 sec  Urinalysis Basic - ( 08 Nov 2023 05:30 )    Color: x / Appearance: x / SG: x / pH: x  Gluc: 107 mg/dL / Ketone: x  / Bili: x / Urobili: x   Blood: x / Protein: x / Nitrite: x   Leuk Esterase: x / RBC: x / WBC x   Sq Epi: x / Non Sq Epi: x / Bacteria: x      CAPILLARY BLOOD GLUCOSE      POCT Blood Glucose.: 98 mg/dL (08 Nov 2023 11:44)  POCT Blood Glucose.: 91 mg/dL (08 Nov 2023 06:07)  POCT Blood Glucose.: 109 mg/dL (07 Nov 2023 21:33)  POCT Blood Glucose.: 97 mg/dL (07 Nov 2023 18:02)      Cultures:      PHYSICAL EXAM:  General: NAD, resting comfortably  HEENT: NC/AT, EOMI, normal hearing, no oral lesions, no LAD, neck supple  Pulmonary: Normal resp effort, CTA-B  Cardiovascular: NSR, no murmurs  Abdominal: Soft, ND/NT, no organomegaly  Groin: Soft, nontender, no ecchymosis/hematoma, no erythema, no edema.  Extremities: (+) DP/PT pulses. FROM, normal strength, no clubbing/cyanosis/erythema/edema  Neuro: A/O x 3, CNs II-XII grossly intact, normal sensation, no focal deficits  Pulses: Palpable distal pulses    RADIOLOGY & ADDITIONAL STUDIES:         HPI: HPI:  63 year old male with know history of splenic flexure and descending colon adenocarcinoma (Aug 2023 by colonoscopy) know to Dr. Montague, HTN, HLD, DMII, morbid obesity presented after evaluation by PCP due to complaints of dyspepsia, hematochezia, and abdominal pain with bloating; patient was in process of colon cancer evaluation. Now has c/o abdominal pain, BRBPR, hematochezia persists, denies current N/V. Patient denies history of HLD DMII or CAD       ALL 12 SYSTEMS Reviewed and negative except for HPI      PMH: descending colon adenocarcinoma, HTN  PSH: denies  Social: Former smoker (quit 8 years ago)  Allergies: denies	  Fam: No family history of cancer 		  Scopes: C-scope 2 months ago - dx of colon cancer   Meds:  Vit D  (07 Nov 2023 13:54)        PAST MEDICAL & SURGICAL HISTORY:  Malignant neoplasm of colon      HTN (hypertension)      DMII (diabetes mellitus, type 2)      CAD (coronary artery disease)      HLD (hyperlipidemia)      No significant past surgical history          Allergies    No Known Allergies    Intolerances        MEDICATIONS  (STANDING):  bisacodyl Suppository 10 milliGRAM(s) Rectal once  dextrose 5%. 1000 milliLiter(s) (50 mL/Hr) IV Continuous <Continuous>  dextrose 5%. 1000 milliLiter(s) (100 mL/Hr) IV Continuous <Continuous>  dextrose 50% Injectable 12.5 Gram(s) IV Push once  dextrose 50% Injectable 25 Gram(s) IV Push once  dextrose 50% Injectable 25 Gram(s) IV Push once  glucagon  Injectable 1 milliGRAM(s) IntraMuscular once  heparin   Injectable 7500 Unit(s) SubCutaneous every 8 hours  influenza   Vaccine 0.5 milliLiter(s) IntraMuscular once  insulin lispro (ADMELOG) corrective regimen sliding scale   SubCutaneous Before meals and at bedtime  lactated ringers. 1000 milliLiter(s) (120 mL/Hr) IV Continuous <Continuous>  metroNIDAZOLE    Tablet 500 milliGRAM(s) Oral <User Schedule>  neomycin 1000 milliGRAM(s) Oral <User Schedule>  pantoprazole  Injectable 40 milliGRAM(s) IV Push daily    MEDICATIONS  (PRN):  acetaminophen     Tablet .. 650 milliGRAM(s) Oral every 6 hours PRN Mild Pain (1 - 3)  dextrose Oral Gel 15 Gram(s) Oral once PRN Blood Glucose LESS THAN 70 milliGRAM(s)/deciliter  HYDROmorphone  Injectable 0.5 milliGRAM(s) IV Push every 4 hours PRN Severe Pain (7 - 10)  HYDROmorphone  Injectable 0.25 milliGRAM(s) IV Push every 4 hours PRN Moderate Pain (4 - 6)  ondansetron Injectable 4 milliGRAM(s) IV Push every 6 hours PRN Nausea      SOCIAL HISTORY:    FAMILY HISTORY:  No pertinent family history in first degree relatives          Vital Signs Last 24 Hrs  T(C): 36.6 (08 Nov 2023 13:56), Max: 36.9 (07 Nov 2023 16:15)  T(F): 97.9 (08 Nov 2023 13:56), Max: 98.4 (07 Nov 2023 16:15)  HR: 76 (08 Nov 2023 13:56) (63 - 88)  BP: 149/78 (08 Nov 2023 13:56) (133/75 - 162/83)  BP(mean): --  RR: 17 (08 Nov 2023 13:56) (14 - 18)  SpO2: 97% (08 Nov 2023 13:56) (95% - 98%)    Parameters below as of 08 Nov 2023 13:56  Patient On (Oxygen Delivery Method): room air        I&O's Summary    07 Nov 2023 07:01  -  08 Nov 2023 07:00  --------------------------------------------------------  IN: 0 mL / OUT: 650 mL / NET: -650 mL    08 Nov 2023 07:01  -  08 Nov 2023 14:28  --------------------------------------------------------  IN: 230 mL / OUT: 0 mL / NET: 230 mL        LABS:                        12.6   7.60  )-----------( 282      ( 08 Nov 2023 05:30 )             40.1     11-08    140  |  107  |  8   ----------------------------<  107<H>  4.2   |  23  |  1.00    Ca    8.4      08 Nov 2023 05:30  Phos  3.4     11-08  Mg     2.4     11-08    TPro  6.9  /  Alb  3.5  /  TBili  0.2  /  DBili  x   /  AST  19  /  ALT  20  /  AlkPhos  82  11-07    LIVER FUNCTIONS - ( 07 Nov 2023 13:48 )  Alb: 3.5 g/dL / Pro: 6.9 g/dL / ALK PHOS: 82 U/L / ALT: 20 U/L / AST: 19 U/L / GGT: x           PT/INR - ( 07 Nov 2023 12:13 )   PT: 11.8 sec;   INR: 1.04          PTT - ( 07 Nov 2023 12:13 )  PTT:29.3 sec  Urinalysis Basic - ( 08 Nov 2023 05:30 )    Color: x / Appearance: x / SG: x / pH: x  Gluc: 107 mg/dL / Ketone: x  / Bili: x / Urobili: x   Blood: x / Protein: x / Nitrite: x   Leuk Esterase: x / RBC: x / WBC x   Sq Epi: x / Non Sq Epi: x / Bacteria: x      CAPILLARY BLOOD GLUCOSE      POCT Blood Glucose.: 98 mg/dL (08 Nov 2023 11:44)  POCT Blood Glucose.: 91 mg/dL (08 Nov 2023 06:07)  POCT Blood Glucose.: 109 mg/dL (07 Nov 2023 21:33)  POCT Blood Glucose.: 97 mg/dL (07 Nov 2023 18:02)      Cultures:      PHYSICAL EXAM:  General: NAD, resting comfortably  HEENT: NC/AT, EOMI, normal hearing, no oral lesions, no LAD, neck supple  Pulmonary: Normal resp effort, CTA-B  Cardiovascular: NSR, no murmurs  Abdominal: Soft, ND/NT, no organomegaly  Groin: Soft, nontender, no ecchymosis/hematoma, no erythema, no edema.  Extremities: (+) DP/PT pulses. FROM, normal strength, no clubbing/cyanosis/erythema/edema  Neuro: A/O x 3, CNs II-XII grossly intact, normal sensation, no focal deficits  Pulses: Palpable distal pulses    RADIOLOGY & ADDITIONAL STUDIES:

## 2023-11-08 NOTE — PROGRESS NOTE ADULT - SUBJECTIVE AND OBJECTIVE BOX
SUBJECTIVE: Pt seen and examined at bedside. No acute complaints. Pt denies N/V, pt states he has not passed gas or had bowel movement.       MEDICATIONS  (STANDING):  dextrose 5%. 1000 milliLiter(s) (50 mL/Hr) IV Continuous <Continuous>  dextrose 5%. 1000 milliLiter(s) (100 mL/Hr) IV Continuous <Continuous>  dextrose 50% Injectable 12.5 Gram(s) IV Push once  dextrose 50% Injectable 25 Gram(s) IV Push once  dextrose 50% Injectable 25 Gram(s) IV Push once  glucagon  Injectable 1 milliGRAM(s) IntraMuscular once  heparin   Injectable 7500 Unit(s) SubCutaneous every 8 hours  influenza   Vaccine 0.5 milliLiter(s) IntraMuscular once  insulin lispro (ADMELOG) corrective regimen sliding scale   SubCutaneous Before meals and at bedtime  pantoprazole  Injectable 40 milliGRAM(s) IV Push daily    MEDICATIONS  (PRN):  acetaminophen     Tablet .. 650 milliGRAM(s) Oral every 6 hours PRN Mild Pain (1 - 3)  dextrose Oral Gel 15 Gram(s) Oral once PRN Blood Glucose LESS THAN 70 milliGRAM(s)/deciliter  HYDROmorphone  Injectable 0.5 milliGRAM(s) IV Push every 4 hours PRN Severe Pain (7 - 10)  HYDROmorphone  Injectable 0.25 milliGRAM(s) IV Push every 4 hours PRN Moderate Pain (4 - 6)  ondansetron Injectable 4 milliGRAM(s) IV Push every 6 hours PRN Nausea      Vital Signs Last 24 Hrs  T(C): 36.7 (08 Nov 2023 04:52), Max: 36.9 (07 Nov 2023 16:15)  T(F): 98.1 (08 Nov 2023 04:52), Max: 98.4 (07 Nov 2023 16:15)  HR: 65 (08 Nov 2023 04:52) (63 - 114)  BP: 133/76 (08 Nov 2023 04:52) (133/75 - 162/83)  BP(mean): --  RR: 18 (08 Nov 2023 04:52) (16 - 18)  SpO2: 98% (08 Nov 2023 04:52) (95% - 98%)    Parameters below as of 08 Nov 2023 04:52  Patient On (Oxygen Delivery Method): room air        PHYSICAL EXAM:  General: NAD, pt resting comfortably in bed  Pulm: No respiratory distress, nonlabored breathing, on room air  CVS: NSR, HDS  Abd: Soft, ND, mildly tender in LLQ  Extremities: WWP, no edema                  I&O's Detail    07 Nov 2023 07:01  -  08 Nov 2023 07:00  --------------------------------------------------------  IN:  Total IN: 0 mL    OUT:    Voided (mL): 650 mL  Total OUT: 650 mL    Total NET: -650 mL          LABS:                        12.6   7.60  )-----------( 282      ( 08 Nov 2023 05:30 )             40.1     11-08    140  |  107  |  8   ----------------------------<  107<H>  4.2   |  23  |  1.00    Ca    8.4      08 Nov 2023 05:30  Phos  3.4     11-08  Mg     2.4     11-08    TPro  6.9  /  Alb  3.5  /  TBili  0.2  /  DBili  x   /  AST  19  /  ALT  20  /  AlkPhos  82  11-07    PT/INR - ( 07 Nov 2023 12:13 )   PT: 11.8 sec;   INR: 1.04          PTT - ( 07 Nov 2023 12:13 )  PTT:29.3 sec  Urinalysis Basic - ( 08 Nov 2023 05:30 )    Color: x / Appearance: x / SG: x / pH: x  Gluc: 107 mg/dL / Ketone: x  / Bili: x / Urobili: x   Blood: x / Protein: x / Nitrite: x   Leuk Esterase: x / RBC: x / WBC x   Sq Epi: x / Non Sq Epi: x / Bacteria: x        RADIOLOGY & ADDITIONAL STUDIES:

## 2023-11-09 LAB
ANION GAP SERPL CALC-SCNC: 11 MMOL/L — SIGNIFICANT CHANGE UP (ref 5–17)
ANION GAP SERPL CALC-SCNC: 11 MMOL/L — SIGNIFICANT CHANGE UP (ref 5–17)
ANION GAP SERPL CALC-SCNC: 12 MMOL/L — SIGNIFICANT CHANGE UP (ref 5–17)
ANION GAP SERPL CALC-SCNC: 12 MMOL/L — SIGNIFICANT CHANGE UP (ref 5–17)
BUN SERPL-MCNC: 8 MG/DL — SIGNIFICANT CHANGE UP (ref 7–23)
CALCIUM SERPL-MCNC: 8.1 MG/DL — LOW (ref 8.4–10.5)
CALCIUM SERPL-MCNC: 8.1 MG/DL — LOW (ref 8.4–10.5)
CALCIUM SERPL-MCNC: 8.6 MG/DL — SIGNIFICANT CHANGE UP (ref 8.4–10.5)
CALCIUM SERPL-MCNC: 8.6 MG/DL — SIGNIFICANT CHANGE UP (ref 8.4–10.5)
CHLORIDE SERPL-SCNC: 104 MMOL/L — SIGNIFICANT CHANGE UP (ref 96–108)
CHLORIDE SERPL-SCNC: 104 MMOL/L — SIGNIFICANT CHANGE UP (ref 96–108)
CHLORIDE SERPL-SCNC: 106 MMOL/L — SIGNIFICANT CHANGE UP (ref 96–108)
CHLORIDE SERPL-SCNC: 106 MMOL/L — SIGNIFICANT CHANGE UP (ref 96–108)
CO2 SERPL-SCNC: 21 MMOL/L — LOW (ref 22–31)
CO2 SERPL-SCNC: 21 MMOL/L — LOW (ref 22–31)
CO2 SERPL-SCNC: 25 MMOL/L — SIGNIFICANT CHANGE UP (ref 22–31)
CO2 SERPL-SCNC: 25 MMOL/L — SIGNIFICANT CHANGE UP (ref 22–31)
CREAT SERPL-MCNC: 1.1 MG/DL — SIGNIFICANT CHANGE UP (ref 0.5–1.3)
CREAT SERPL-MCNC: 1.1 MG/DL — SIGNIFICANT CHANGE UP (ref 0.5–1.3)
CREAT SERPL-MCNC: 1.16 MG/DL — SIGNIFICANT CHANGE UP (ref 0.5–1.3)
CREAT SERPL-MCNC: 1.16 MG/DL — SIGNIFICANT CHANGE UP (ref 0.5–1.3)
EGFR: 71 ML/MIN/1.73M2 — SIGNIFICANT CHANGE UP
EGFR: 71 ML/MIN/1.73M2 — SIGNIFICANT CHANGE UP
EGFR: 75 ML/MIN/1.73M2 — SIGNIFICANT CHANGE UP
EGFR: 75 ML/MIN/1.73M2 — SIGNIFICANT CHANGE UP
GLUCOSE BLDC GLUCOMTR-MCNC: 136 MG/DL — HIGH (ref 70–99)
GLUCOSE BLDC GLUCOMTR-MCNC: 136 MG/DL — HIGH (ref 70–99)
GLUCOSE BLDC GLUCOMTR-MCNC: 92 MG/DL — SIGNIFICANT CHANGE UP (ref 70–99)
GLUCOSE BLDC GLUCOMTR-MCNC: 92 MG/DL — SIGNIFICANT CHANGE UP (ref 70–99)
GLUCOSE SERPL-MCNC: 158 MG/DL — HIGH (ref 70–99)
GLUCOSE SERPL-MCNC: 158 MG/DL — HIGH (ref 70–99)
GLUCOSE SERPL-MCNC: 83 MG/DL — SIGNIFICANT CHANGE UP (ref 70–99)
GLUCOSE SERPL-MCNC: 83 MG/DL — SIGNIFICANT CHANGE UP (ref 70–99)
HCT VFR BLD CALC: 37.9 % — LOW (ref 39–50)
HCT VFR BLD CALC: 37.9 % — LOW (ref 39–50)
HCT VFR BLD CALC: 40.3 % — SIGNIFICANT CHANGE UP (ref 39–50)
HCT VFR BLD CALC: 40.3 % — SIGNIFICANT CHANGE UP (ref 39–50)
HGB BLD-MCNC: 12 G/DL — LOW (ref 13–17)
HGB BLD-MCNC: 12 G/DL — LOW (ref 13–17)
HGB BLD-MCNC: 12.6 G/DL — LOW (ref 13–17)
HGB BLD-MCNC: 12.6 G/DL — LOW (ref 13–17)
MAGNESIUM SERPL-MCNC: 2.3 MG/DL — SIGNIFICANT CHANGE UP (ref 1.6–2.6)
MAGNESIUM SERPL-MCNC: 2.3 MG/DL — SIGNIFICANT CHANGE UP (ref 1.6–2.6)
MCHC RBC-ENTMCNC: 25.9 PG — LOW (ref 27–34)
MCHC RBC-ENTMCNC: 25.9 PG — LOW (ref 27–34)
MCHC RBC-ENTMCNC: 26.1 PG — LOW (ref 27–34)
MCHC RBC-ENTMCNC: 26.1 PG — LOW (ref 27–34)
MCHC RBC-ENTMCNC: 31.3 GM/DL — LOW (ref 32–36)
MCHC RBC-ENTMCNC: 31.3 GM/DL — LOW (ref 32–36)
MCHC RBC-ENTMCNC: 31.7 GM/DL — LOW (ref 32–36)
MCHC RBC-ENTMCNC: 31.7 GM/DL — LOW (ref 32–36)
MCV RBC AUTO: 81.9 FL — SIGNIFICANT CHANGE UP (ref 80–100)
MCV RBC AUTO: 81.9 FL — SIGNIFICANT CHANGE UP (ref 80–100)
MCV RBC AUTO: 83.4 FL — SIGNIFICANT CHANGE UP (ref 80–100)
MCV RBC AUTO: 83.4 FL — SIGNIFICANT CHANGE UP (ref 80–100)
NRBC # BLD: 0 /100 WBCS — SIGNIFICANT CHANGE UP (ref 0–0)
PHOSPHATE SERPL-MCNC: 3.4 MG/DL — SIGNIFICANT CHANGE UP (ref 2.5–4.5)
PHOSPHATE SERPL-MCNC: 3.4 MG/DL — SIGNIFICANT CHANGE UP (ref 2.5–4.5)
PLATELET # BLD AUTO: 268 K/UL — SIGNIFICANT CHANGE UP (ref 150–400)
PLATELET # BLD AUTO: 268 K/UL — SIGNIFICANT CHANGE UP (ref 150–400)
PLATELET # BLD AUTO: 281 K/UL — SIGNIFICANT CHANGE UP (ref 150–400)
PLATELET # BLD AUTO: 281 K/UL — SIGNIFICANT CHANGE UP (ref 150–400)
POTASSIUM SERPL-MCNC: 3.8 MMOL/L — SIGNIFICANT CHANGE UP (ref 3.5–5.3)
POTASSIUM SERPL-MCNC: 3.8 MMOL/L — SIGNIFICANT CHANGE UP (ref 3.5–5.3)
POTASSIUM SERPL-MCNC: 4 MMOL/L — SIGNIFICANT CHANGE UP (ref 3.5–5.3)
POTASSIUM SERPL-MCNC: 4 MMOL/L — SIGNIFICANT CHANGE UP (ref 3.5–5.3)
POTASSIUM SERPL-SCNC: 3.8 MMOL/L — SIGNIFICANT CHANGE UP (ref 3.5–5.3)
POTASSIUM SERPL-SCNC: 3.8 MMOL/L — SIGNIFICANT CHANGE UP (ref 3.5–5.3)
POTASSIUM SERPL-SCNC: 4 MMOL/L — SIGNIFICANT CHANGE UP (ref 3.5–5.3)
POTASSIUM SERPL-SCNC: 4 MMOL/L — SIGNIFICANT CHANGE UP (ref 3.5–5.3)
RBC # BLD: 4.63 M/UL — SIGNIFICANT CHANGE UP (ref 4.2–5.8)
RBC # BLD: 4.63 M/UL — SIGNIFICANT CHANGE UP (ref 4.2–5.8)
RBC # BLD: 4.83 M/UL — SIGNIFICANT CHANGE UP (ref 4.2–5.8)
RBC # BLD: 4.83 M/UL — SIGNIFICANT CHANGE UP (ref 4.2–5.8)
RBC # FLD: 22.5 % — HIGH (ref 10.3–14.5)
RBC # FLD: 22.5 % — HIGH (ref 10.3–14.5)
RBC # FLD: 23 % — HIGH (ref 10.3–14.5)
RBC # FLD: 23 % — HIGH (ref 10.3–14.5)
SODIUM SERPL-SCNC: 137 MMOL/L — SIGNIFICANT CHANGE UP (ref 135–145)
SODIUM SERPL-SCNC: 137 MMOL/L — SIGNIFICANT CHANGE UP (ref 135–145)
SODIUM SERPL-SCNC: 142 MMOL/L — SIGNIFICANT CHANGE UP (ref 135–145)
SODIUM SERPL-SCNC: 142 MMOL/L — SIGNIFICANT CHANGE UP (ref 135–145)
WBC # BLD: 19.24 K/UL — HIGH (ref 3.8–10.5)
WBC # BLD: 19.24 K/UL — HIGH (ref 3.8–10.5)
WBC # BLD: 8.22 K/UL — SIGNIFICANT CHANGE UP (ref 3.8–10.5)
WBC # BLD: 8.22 K/UL — SIGNIFICANT CHANGE UP (ref 3.8–10.5)
WBC # FLD AUTO: 19.24 K/UL — HIGH (ref 3.8–10.5)
WBC # FLD AUTO: 19.24 K/UL — HIGH (ref 3.8–10.5)
WBC # FLD AUTO: 8.22 K/UL — SIGNIFICANT CHANGE UP (ref 3.8–10.5)
WBC # FLD AUTO: 8.22 K/UL — SIGNIFICANT CHANGE UP (ref 3.8–10.5)

## 2023-11-09 PROCEDURE — 88342 IMHCHEM/IMCYTCHM 1ST ANTB: CPT | Mod: 26

## 2023-11-09 PROCEDURE — 88341 IMHCHEM/IMCYTCHM EA ADD ANTB: CPT | Mod: 26

## 2023-11-09 PROCEDURE — 88309 TISSUE EXAM BY PATHOLOGIST: CPT | Mod: 26

## 2023-11-09 PROCEDURE — 88304 TISSUE EXAM BY PATHOLOGIST: CPT | Mod: 26

## 2023-11-09 DEVICE — STAPLER COVIDIEN PURSTRING 65MM: Type: IMPLANTABLE DEVICE | Site: LEFT | Status: FUNCTIONAL

## 2023-11-09 DEVICE — STAPLER ETHICON PROXIMATE 75MM WITH BLUE RELOAD: Type: IMPLANTABLE DEVICE | Site: LEFT | Status: FUNCTIONAL

## 2023-11-09 DEVICE — LIGATING CLIPS WECK HEMOLOK POLYMER MEDIUM-LARGE (GREEN) 6: Type: IMPLANTABLE DEVICE | Site: LEFT | Status: FUNCTIONAL

## 2023-11-09 DEVICE — STAPLER CONTOUR CURVED WITH BLUE CART: Type: IMPLANTABLE DEVICE | Site: LEFT | Status: FUNCTIONAL

## 2023-11-09 DEVICE — STAPLER ETHICON PROXIMATE RELOAD 75MM BLUE: Type: IMPLANTABLE DEVICE | Site: LEFT | Status: FUNCTIONAL

## 2023-11-09 DEVICE — STAPLER COVIDIEN EEA CIRCULAR DST 28MM 4.5MM BLUE: Type: IMPLANTABLE DEVICE | Site: LEFT | Status: FUNCTIONAL

## 2023-11-09 RX ORDER — DEXTROSE 50 % IN WATER 50 %
25 SYRINGE (ML) INTRAVENOUS ONCE
Refills: 0 | Status: DISCONTINUED | OUTPATIENT
Start: 2023-11-09 | End: 2023-11-14

## 2023-11-09 RX ORDER — ONDANSETRON 8 MG/1
4 TABLET, FILM COATED ORAL EVERY 8 HOURS
Refills: 0 | Status: DISCONTINUED | OUTPATIENT
Start: 2023-11-09 | End: 2023-11-14

## 2023-11-09 RX ORDER — INSULIN LISPRO 100/ML
VIAL (ML) SUBCUTANEOUS AT BEDTIME
Refills: 0 | Status: DISCONTINUED | OUTPATIENT
Start: 2023-11-09 | End: 2023-11-14

## 2023-11-09 RX ORDER — DEXTROSE 50 % IN WATER 50 %
15 SYRINGE (ML) INTRAVENOUS ONCE
Refills: 0 | Status: DISCONTINUED | OUTPATIENT
Start: 2023-11-09 | End: 2023-11-14

## 2023-11-09 RX ORDER — ACETAMINOPHEN 500 MG
1000 TABLET ORAL EVERY 6 HOURS
Refills: 0 | Status: DISCONTINUED | OUTPATIENT
Start: 2023-11-09 | End: 2023-11-09

## 2023-11-09 RX ORDER — SODIUM CHLORIDE 9 MG/ML
1000 INJECTION, SOLUTION INTRAVENOUS
Refills: 0 | Status: DISCONTINUED | OUTPATIENT
Start: 2023-11-09 | End: 2023-11-14

## 2023-11-09 RX ORDER — HEPARIN SODIUM 5000 [USP'U]/ML
7500 INJECTION INTRAVENOUS; SUBCUTANEOUS EVERY 8 HOURS
Refills: 0 | Status: DISCONTINUED | OUTPATIENT
Start: 2023-11-10 | End: 2023-11-14

## 2023-11-09 RX ORDER — HYDROMORPHONE HYDROCHLORIDE 2 MG/ML
0.5 INJECTION INTRAMUSCULAR; INTRAVENOUS; SUBCUTANEOUS
Refills: 0 | Status: DISCONTINUED | OUTPATIENT
Start: 2023-11-09 | End: 2023-11-14

## 2023-11-09 RX ORDER — GLUCAGON INJECTION, SOLUTION 0.5 MG/.1ML
1 INJECTION, SOLUTION SUBCUTANEOUS ONCE
Refills: 0 | Status: DISCONTINUED | OUTPATIENT
Start: 2023-11-09 | End: 2023-11-14

## 2023-11-09 RX ORDER — DEXTROSE 50 % IN WATER 50 %
12.5 SYRINGE (ML) INTRAVENOUS ONCE
Refills: 0 | Status: DISCONTINUED | OUTPATIENT
Start: 2023-11-09 | End: 2023-11-14

## 2023-11-09 RX ORDER — SODIUM CHLORIDE 9 MG/ML
1000 INJECTION, SOLUTION INTRAVENOUS
Refills: 0 | Status: DISCONTINUED | OUTPATIENT
Start: 2023-11-09 | End: 2023-11-12

## 2023-11-09 RX ORDER — INSULIN LISPRO 100/ML
VIAL (ML) SUBCUTANEOUS
Refills: 0 | Status: DISCONTINUED | OUTPATIENT
Start: 2023-11-09 | End: 2023-11-14

## 2023-11-09 RX ORDER — ACETAMINOPHEN 500 MG
650 TABLET ORAL EVERY 6 HOURS
Refills: 0 | Status: DISCONTINUED | OUTPATIENT
Start: 2023-11-10 | End: 2023-11-10

## 2023-11-09 RX ORDER — HYDROMORPHONE HYDROCHLORIDE 2 MG/ML
0.25 INJECTION INTRAMUSCULAR; INTRAVENOUS; SUBCUTANEOUS EVERY 4 HOURS
Refills: 0 | Status: DISCONTINUED | OUTPATIENT
Start: 2023-11-09 | End: 2023-11-14

## 2023-11-09 RX ORDER — POTASSIUM CHLORIDE 20 MEQ
10 PACKET (EA) ORAL
Refills: 0 | Status: DISCONTINUED | OUTPATIENT
Start: 2023-11-09 | End: 2023-11-14

## 2023-11-09 RX ORDER — HYDROMORPHONE HYDROCHLORIDE 2 MG/ML
0.5 INJECTION INTRAMUSCULAR; INTRAVENOUS; SUBCUTANEOUS EVERY 4 HOURS
Refills: 0 | Status: DISCONTINUED | OUTPATIENT
Start: 2023-11-09 | End: 2023-11-14

## 2023-11-09 RX ADMIN — Medication 100 MILLIEQUIVALENT(S): at 10:46

## 2023-11-09 RX ADMIN — Medication 1000 MILLIGRAM(S): at 23:43

## 2023-11-09 RX ADMIN — HEPARIN SODIUM 7500 UNIT(S): 5000 INJECTION INTRAVENOUS; SUBCUTANEOUS at 07:03

## 2023-11-09 NOTE — PROGRESS NOTE ADULT - SUBJECTIVE AND OBJECTIVE BOX
INTERVAL HPI/OVERNIGHT EVENTS: NPO @mn, IVF. Completed abx & bowel prep.+ light brown liquid stools    SUBJECTIVE: Pt seen and examined at bedside this am by surgery team. No acute complaints. Reports having clear BMs. Pain well controlled. Denies f/n/v/cp/sob.    MEDICATIONS  (STANDING):  dextrose 5%. 1000 milliLiter(s) (50 mL/Hr) IV Continuous <Continuous>  dextrose 5%. 1000 milliLiter(s) (100 mL/Hr) IV Continuous <Continuous>  dextrose 50% Injectable 12.5 Gram(s) IV Push once  dextrose 50% Injectable 25 Gram(s) IV Push once  dextrose 50% Injectable 25 Gram(s) IV Push once  glucagon  Injectable 1 milliGRAM(s) IntraMuscular once  heparin   Injectable 7500 Unit(s) SubCutaneous every 8 hours  influenza   Vaccine 0.5 milliLiter(s) IntraMuscular once  insulin lispro (ADMELOG) corrective regimen sliding scale   SubCutaneous Before meals and at bedtime  lactated ringers. 1000 milliLiter(s) (120 mL/Hr) IV Continuous <Continuous>  pantoprazole  Injectable 40 milliGRAM(s) IV Push daily    MEDICATIONS  (PRN):  acetaminophen     Tablet .. 650 milliGRAM(s) Oral every 6 hours PRN Mild Pain (1 - 3)  dextrose Oral Gel 15 Gram(s) Oral once PRN Blood Glucose LESS THAN 70 milliGRAM(s)/deciliter  HYDROmorphone  Injectable 0.5 milliGRAM(s) IV Push every 4 hours PRN Severe Pain (7 - 10)  HYDROmorphone  Injectable 0.25 milliGRAM(s) IV Push every 4 hours PRN Moderate Pain (4 - 6)  ondansetron Injectable 4 milliGRAM(s) IV Push every 6 hours PRN Nausea    Vital Signs Last 24 Hrs  T(C): 36.9 (09 Nov 2023 05:23), Max: 36.9 (08 Nov 2023 10:00)  T(F): 98.5 (09 Nov 2023 05:23), Max: 98.5 (09 Nov 2023 05:23)  HR: 63 (09 Nov 2023 05:23) (63 - 89)  BP: 127/77 (09 Nov 2023 05:23) (110/75 - 153/88)  BP(mean): 94 (09 Nov 2023 05:23) (94 - 94)  RR: 17 (09 Nov 2023 05:23) (14 - 17)  SpO2: 98% (09 Nov 2023 05:23) (95% - 98%)    Parameters below as of 09 Nov 2023 05:23  Patient On (Oxygen Delivery Method): room air    PHYSICAL EXAM:    Constitutional: A&Ox3, NAD    Respiratory: non labored breathing, no respiratory distress    Cardiovascular: NSR, RRR    Gastrointestinal: abdomen soft, nd, nt. No R/G.     Extremities: wwp, no calf tenderness or edema. SCDs in place       I&O's Detail    08 Nov 2023 07:01  -  09 Nov 2023 07:00  --------------------------------------------------------  IN:    Lactated Ringers: 840 mL    Oral Fluid: 470 mL  Total IN: 1310 mL    OUT:    Voided (mL): 1000 mL  Total OUT: 1000 mL    Total NET: 310 mL          LABS:                        12.6   7.60  )-----------( 282      ( 08 Nov 2023 05:30 )             40.1     11-08    140  |  107  |  8   ----------------------------<  107<H>  4.2   |  23  |  1.00    Ca    8.4      08 Nov 2023 05:30  Phos  3.4     11-08  Mg     2.4     11-08    TPro  6.9  /  Alb  3.5  /  TBili  0.2  /  DBili  x   /  AST  19  /  ALT  20  /  AlkPhos  82  11-07    PT/INR - ( 07 Nov 2023 12:13 )   PT: 11.8 sec;   INR: 1.04          PTT - ( 07 Nov 2023 12:13 )  PTT:29.3 sec  Urinalysis Basic - ( 08 Nov 2023 05:30 )    Color: x / Appearance: x / SG: x / pH: x  Gluc: 107 mg/dL / Ketone: x  / Bili: x / Urobili: x   Blood: x / Protein: x / Nitrite: x   Leuk Esterase: x / RBC: x / WBC x   Sq Epi: x / Non Sq Epi: x / Bacteria: x        RADIOLOGY & ADDITIONAL STUDIES:

## 2023-11-09 NOTE — BRIEF OPERATIVE NOTE - NSICDXBRIEFPOSTOP_GEN_ALL_CORE_FT
POST-OP DIAGNOSIS:  Colon cancer 09-Nov-2023 19:29:11  Montse Kaur  Colon tumor 09-Nov-2023 19:29:20 sigmoid and splenic flexure Montse Kaur

## 2023-11-09 NOTE — PROGRESS NOTE ADULT - SUBJECTIVE AND OBJECTIVE BOX
63 year old male with know history of splenic flexure and descending colon adenocarcinoma (Aug 2023 by colonoscopy) know to Dr. Montague, HTN, HLD, DMII, morbid obesity presented after evaluation by PCP due to complaints of dyspepsia, hematochezia, and abdominal pain with bloating; patient was in process of colon cancer evaluation. Now has c/o abdominal pain, BRBPR, hematochezia persists, denies current N/V. Patient denies history of HLD DMII or CAD   OR today for hemicolectomy       ALL 12 SYSTEMS Reviewed and negative except for HPI      Vital Signs Last 24 Hrs  T(C): 36.8 (09 Nov 2023 11:56), Max: 36.9 (09 Nov 2023 05:23)  T(F): 98.3 (09 Nov 2023 08:13), Max: 98.5 (09 Nov 2023 05:23)  HR: 66 (09 Nov 2023 11:56) (63 - 89)  BP: 145/73 (09 Nov 2023 11:56) (110/75 - 153/88)  BP(mean): 94 (09 Nov 2023 11:56) (94 - 94)  RR: 16 (09 Nov 2023 11:56) (16 - 17)  SpO2: 98% (09 Nov 2023 11:56) (94% - 98%)    Parameters below as of 09 Nov 2023 08:13  Patient On (Oxygen Delivery Method): room air        MEDICATIONS  (STANDING):  influenza   Vaccine 0.5 milliLiter(s) IntraMuscular once  potassium chloride  10 mEq/100 mL IVPB 10 milliEquivalent(s) IV Intermittent every 1 hour    MEDICATIONS  (PRN):      I&O's Summary    07 Nov 2023 07:01  -  08 Nov 2023 07:00  --------------------------------------------------------  IN: 0 mL / OUT: 650 mL / NET: -650 mL    08 Nov 2023 07:01  -  08 Nov 2023 14:28  --------------------------------------------------------  IN: 230 mL / OUT: 0 mL / NET: 230 mL        LABS:                        12.6   7.60  )-----------( 282      ( 08 Nov 2023 05:30 )             40.1     11-08    140  |  107  |  8   ----------------------------<  107<H>  4.2   |  23  |  1.00    Ca    8.4      08 Nov 2023 05:30  Phos  3.4     11-08  Mg     2.4     11-08    TPro  6.9  /  Alb  3.5  /  TBili  0.2  /  DBili  x   /  AST  19  /  ALT  20  /  AlkPhos  82  11-07    LIVER FUNCTIONS - ( 07 Nov 2023 13:48 )  Alb: 3.5 g/dL / Pro: 6.9 g/dL / ALK PHOS: 82 U/L / ALT: 20 U/L / AST: 19 U/L / GGT: x           PT/INR - ( 07 Nov 2023 12:13 )   PT: 11.8 sec;   INR: 1.04          PTT - ( 07 Nov 2023 12:13 )  PTT:29.3 sec  Urinalysis Basic - ( 08 Nov 2023 05:30 )    Color: x / Appearance: x / SG: x / pH: x  Gluc: 107 mg/dL / Ketone: x  / Bili: x / Urobili: x   Blood: x / Protein: x / Nitrite: x   Leuk Esterase: x / RBC: x / WBC x   Sq Epi: x / Non Sq Epi: x / Bacteria: x      CAPILLARY BLOOD GLUCOSE      POCT Blood Glucose.: 98 mg/dL (08 Nov 2023 11:44)  POCT Blood Glucose.: 91 mg/dL (08 Nov 2023 06:07)  POCT Blood Glucose.: 109 mg/dL (07 Nov 2023 21:33)  POCT Blood Glucose.: 97 mg/dL (07 Nov 2023 18:02)      Cultures:      PHYSICAL EXAM:  General: NAD, resting comfortably  HEENT: NC/AT, EOMI, normal hearing, no oral lesions, no LAD, neck supple  Pulmonary: Normal resp effort, CTA-B  Cardiovascular: NSR, no murmurs  Abdominal: Soft, ND/NT, no organomegaly  Groin: Soft, nontender, no ecchymosis/hematoma, no erythema, no edema.  Extremities: (+) DP/PT pulses. FROM, normal strength, no clubbing/cyanosis/erythema/edema  Neuro: A/O x 3, CNs II-XII grossly intact, normal sensation, no focal deficits  Pulses: Palpable distal pulses    RADIOLOGY & ADDITIONAL STUDIES:

## 2023-11-09 NOTE — PROGRESS NOTE ADULT - ASSESSMENT
64yo male with known hx of splenic flexure and descending colon adenocarcinoma (8/2023 by colonoscopy), HTN, HLD, DMII, morbid obesity referred to [facility] ED by PCP for increasing abdominal pain, bloody bowel movements, increasing abdominal bloating and distension. Patient refers that over the past couple of days he has been experiencing crampy lower abdominal pain with increasing feeling of bloating. States he was being worked up for colon malignancy after he started to notice blood on the toilet after bowel movements and while wiping. Today he refers intermittent, dull lower abdominal pain not associated with nausea, vomiting, blood per rectum, melena. S/p Extended R hemicolectomy (11/9). POC WNL    CLD/ IVF  Pain/nausea control prn  Penrose  SQH @ 3am/SCDs  Alec  ISS  OOBA  AM labs

## 2023-11-09 NOTE — BRIEF OPERATIVE NOTE - NSICDXBRIEFPREOP_GEN_ALL_CORE_FT
PRE-OP DIAGNOSIS:  Colon cancer 09-Nov-2023 19:28:50  Montse Kaur  Colon tumor 09-Nov-2023 19:28:59 sigmoid and splenic flexure Montse Kaur

## 2023-11-09 NOTE — PROGRESS NOTE ADULT - ASSESSMENT
63 year old male with know history of splenic flexure and descending colon adenocarcinoma (Aug 2023 by colonoscopy) know to Dr. Montague, HTN morbid obesity presented after evaluation by PCP due to complaints of dyspepsia, hematochezia, and abdominal pain with bloating; patient was in process of colon cancer evaluation. Now has c/o abdominal pain, BRBPR, hematochezia persists     patient non compliant with anti HTN medications, if needed can place on norvasc with holding parameters post op   CXR unremarkable, labs/ EKG reviewed  patient with METS > 4, no SOB / CP with moderate daily activities   [place] cardiac risk of 0.2%   patient is a class III RCI score risk, 2 points for elevated risk surgery, Q Waves present on EKG

## 2023-11-09 NOTE — BRIEF OPERATIVE NOTE - OPERATION/FINDINGS
Laparoscopic entry. Mobilized left colon lateral to medial, taking down white line with care to protect ureter. Mobilized splenic flexure. Entered lesser sac and mobilized transverse colon further to gain additional reach. Tumors were identified by tattoo and palpated at sigmoid and splenic flexure. Took descending colon mesentery and transverse colon mesentery. Converted to open, and used wound protector. Blue load EndoGIA used to transect proximal transverse colon 5 cm proximal to tumor. Blue load contour stapler used to transect distal rectum. Purstring device used and anvil placed in proximal colon. End to end anastomosis created transrectally. Leak test performed with rigid sigmoidoscopy and was negative. Closing tray utilized. Fascia closed with looped 0 PDS x2. Subcutaneous tissue reapproximated using 3-0 vicryl. Enzorb midline skin closure with penrose drain through base along fascia to inferior aspect of wound. Skin at port sites closed with 4-0 monocryl.

## 2023-11-09 NOTE — PROGRESS NOTE ADULT - SUBJECTIVE AND OBJECTIVE BOX
Procedure: extended R hemicolect  Surgeon: Dr. Montague    S: Pt has no complaints. Pain controlled with medication. Denies CP, SOB, ASTUDILLO, calf tenderness.     O:  T(C): 36.7 (11-09-23 @ 22:01), Max: 36.7 (11-09-23 @ 22:01)  T(F): 98 (11-09-23 @ 22:01), Max: 98 (11-09-23 @ 22:01)  HR: 75 (11-09-23 @ 22:01) (67 - 82)  BP: 118/72 (11-09-23 @ 22:01) (105/66 - 131/65)  RR: 17 (11-09-23 @ 22:01) (11 - 19)  SpO2: 100% (11-09-23 @ 22:01) (98% - 100%)  Wt(kg): --                        12.0   19.24 )-----------( 268      ( 09 Nov 2023 20:45 )             37.9     11-09    137  |  104  |  8   ----------------------------<  158<H>  4.0   |  21<L>  |  1.10    Ca    8.1<L>      09 Nov 2023 20:45  Phos  3.4     11-09  Mg     2.3     11-09        Gen: NAD, resting comfortably in bed  C/V: NSR  Pulm: Nonlabored breathing, no respiratory distress  Abd: soft, NT/ND. No rebound or guarding   Incision: c/d/i  Michael; yellow urine  Extrem: WWP, no calf edema, SCDs in place       Procedure: extended R hemicolect  Surgeon: Dr. Montague    S: Pt has no complaints. Pain controlled with medication. NO attempt for PO intake yet, -n/-v/-f/-nm. Denies CP, SOB, ASTUDILLO, calf tenderness.     O:  T(C): 36.7 (11-09-23 @ 22:01), Max: 36.7 (11-09-23 @ 22:01)  T(F): 98 (11-09-23 @ 22:01), Max: 98 (11-09-23 @ 22:01)  HR: 75 (11-09-23 @ 22:01) (67 - 82)  BP: 118/72 (11-09-23 @ 22:01) (105/66 - 131/65)  RR: 17 (11-09-23 @ 22:01) (11 - 19)  SpO2: 100% (11-09-23 @ 22:01) (98% - 100%)  Wt(kg): --                        12.0   19.24 )-----------( 268      ( 09 Nov 2023 20:45 )             37.9     11-09    137  |  104  |  8   ----------------------------<  158<H>  4.0   |  21<L>  |  1.10    Ca    8.1<L>      09 Nov 2023 20:45  Phos  3.4     11-09  Mg     2.3     11-09        Gen: NAD, resting comfortably in bed  C/V: NSR  Pulm: Nonlabored breathing, no respiratory distress  Abd: soft, NT/ND. No rebound or guarding   Incision: c/d/i  Michael; yellow urine  Extrem: WWP, no calf edema, SCDs in place

## 2023-11-09 NOTE — PROGRESS NOTE ADULT - ASSESSMENT
62yo male with known hx of splenic flexure and descending colon adenocarcinoma (8/2023 by colonoscopy), HTN, HLD, DMII, morbid obesity referred to [facility] ED by PCP for increasing abdominal pain, bloody bowel movements, increasing abdominal bloating and distension. Patient refers that over the past couple of days he has been experiencing crampy lower abdominal pain with increasing feeling of bloating. States he was being worked up for colon malignancy after he started to notice blood on the toilet after bowel movements and while wiping. Today he refers intermittent, dull lower abdominal pain not associated with nausea, vomiting, blood per rectum, melena. Plan for OR 11/9    Bowel prep for OR 11/9  NPO for OR / IVF  Pain/nausea control prn  SQH/SCDs  ISS  OOBA  AM labs

## 2023-11-10 LAB
A1C WITH ESTIMATED AVERAGE GLUCOSE RESULT: 5.9 % — HIGH (ref 4–5.6)
A1C WITH ESTIMATED AVERAGE GLUCOSE RESULT: 5.9 % — HIGH (ref 4–5.6)
ANION GAP SERPL CALC-SCNC: 14 MMOL/L — SIGNIFICANT CHANGE UP (ref 5–17)
ANION GAP SERPL CALC-SCNC: 14 MMOL/L — SIGNIFICANT CHANGE UP (ref 5–17)
BUN SERPL-MCNC: 9 MG/DL — SIGNIFICANT CHANGE UP (ref 7–23)
BUN SERPL-MCNC: 9 MG/DL — SIGNIFICANT CHANGE UP (ref 7–23)
CALCIUM SERPL-MCNC: 8.2 MG/DL — LOW (ref 8.4–10.5)
CALCIUM SERPL-MCNC: 8.2 MG/DL — LOW (ref 8.4–10.5)
CHLORIDE SERPL-SCNC: 104 MMOL/L — SIGNIFICANT CHANGE UP (ref 96–108)
CHLORIDE SERPL-SCNC: 104 MMOL/L — SIGNIFICANT CHANGE UP (ref 96–108)
CO2 SERPL-SCNC: 23 MMOL/L — SIGNIFICANT CHANGE UP (ref 22–31)
CO2 SERPL-SCNC: 23 MMOL/L — SIGNIFICANT CHANGE UP (ref 22–31)
CREAT SERPL-MCNC: 1.17 MG/DL — SIGNIFICANT CHANGE UP (ref 0.5–1.3)
CREAT SERPL-MCNC: 1.17 MG/DL — SIGNIFICANT CHANGE UP (ref 0.5–1.3)
EGFR: 70 ML/MIN/1.73M2 — SIGNIFICANT CHANGE UP
EGFR: 70 ML/MIN/1.73M2 — SIGNIFICANT CHANGE UP
ESTIMATED AVERAGE GLUCOSE: 123 MG/DL — HIGH (ref 68–114)
ESTIMATED AVERAGE GLUCOSE: 123 MG/DL — HIGH (ref 68–114)
GLUCOSE BLDC GLUCOMTR-MCNC: 115 MG/DL — HIGH (ref 70–99)
GLUCOSE BLDC GLUCOMTR-MCNC: 115 MG/DL — HIGH (ref 70–99)
GLUCOSE BLDC GLUCOMTR-MCNC: 128 MG/DL — HIGH (ref 70–99)
GLUCOSE BLDC GLUCOMTR-MCNC: 128 MG/DL — HIGH (ref 70–99)
GLUCOSE BLDC GLUCOMTR-MCNC: 142 MG/DL — HIGH (ref 70–99)
GLUCOSE BLDC GLUCOMTR-MCNC: 142 MG/DL — HIGH (ref 70–99)
GLUCOSE BLDC GLUCOMTR-MCNC: 147 MG/DL — HIGH (ref 70–99)
GLUCOSE BLDC GLUCOMTR-MCNC: 147 MG/DL — HIGH (ref 70–99)
GLUCOSE SERPL-MCNC: 120 MG/DL — HIGH (ref 70–99)
GLUCOSE SERPL-MCNC: 120 MG/DL — HIGH (ref 70–99)
HCT VFR BLD CALC: 38.4 % — LOW (ref 39–50)
HCT VFR BLD CALC: 38.4 % — LOW (ref 39–50)
HGB BLD-MCNC: 12 G/DL — LOW (ref 13–17)
HGB BLD-MCNC: 12 G/DL — LOW (ref 13–17)
MAGNESIUM SERPL-MCNC: 1.8 MG/DL — SIGNIFICANT CHANGE UP (ref 1.6–2.6)
MAGNESIUM SERPL-MCNC: 1.8 MG/DL — SIGNIFICANT CHANGE UP (ref 1.6–2.6)
MCHC RBC-ENTMCNC: 26.1 PG — LOW (ref 27–34)
MCHC RBC-ENTMCNC: 26.1 PG — LOW (ref 27–34)
MCHC RBC-ENTMCNC: 31.3 GM/DL — LOW (ref 32–36)
MCHC RBC-ENTMCNC: 31.3 GM/DL — LOW (ref 32–36)
MCV RBC AUTO: 83.5 FL — SIGNIFICANT CHANGE UP (ref 80–100)
MCV RBC AUTO: 83.5 FL — SIGNIFICANT CHANGE UP (ref 80–100)
NRBC # BLD: 0 /100 WBCS — SIGNIFICANT CHANGE UP (ref 0–0)
NRBC # BLD: 0 /100 WBCS — SIGNIFICANT CHANGE UP (ref 0–0)
PHOSPHATE SERPL-MCNC: 3.7 MG/DL — SIGNIFICANT CHANGE UP (ref 2.5–4.5)
PHOSPHATE SERPL-MCNC: 3.7 MG/DL — SIGNIFICANT CHANGE UP (ref 2.5–4.5)
PLATELET # BLD AUTO: 274 K/UL — SIGNIFICANT CHANGE UP (ref 150–400)
PLATELET # BLD AUTO: 274 K/UL — SIGNIFICANT CHANGE UP (ref 150–400)
POTASSIUM SERPL-MCNC: 3.7 MMOL/L — SIGNIFICANT CHANGE UP (ref 3.5–5.3)
POTASSIUM SERPL-MCNC: 3.7 MMOL/L — SIGNIFICANT CHANGE UP (ref 3.5–5.3)
POTASSIUM SERPL-SCNC: 3.7 MMOL/L — SIGNIFICANT CHANGE UP (ref 3.5–5.3)
POTASSIUM SERPL-SCNC: 3.7 MMOL/L — SIGNIFICANT CHANGE UP (ref 3.5–5.3)
RBC # BLD: 4.6 M/UL — SIGNIFICANT CHANGE UP (ref 4.2–5.8)
RBC # BLD: 4.6 M/UL — SIGNIFICANT CHANGE UP (ref 4.2–5.8)
RBC # FLD: 22.9 % — HIGH (ref 10.3–14.5)
RBC # FLD: 22.9 % — HIGH (ref 10.3–14.5)
SODIUM SERPL-SCNC: 141 MMOL/L — SIGNIFICANT CHANGE UP (ref 135–145)
SODIUM SERPL-SCNC: 141 MMOL/L — SIGNIFICANT CHANGE UP (ref 135–145)
WBC # BLD: 13.69 K/UL — HIGH (ref 3.8–10.5)
WBC # BLD: 13.69 K/UL — HIGH (ref 3.8–10.5)
WBC # FLD AUTO: 13.69 K/UL — HIGH (ref 3.8–10.5)
WBC # FLD AUTO: 13.69 K/UL — HIGH (ref 3.8–10.5)

## 2023-11-10 RX ORDER — MAGNESIUM SULFATE 500 MG/ML
1 VIAL (ML) INJECTION ONCE
Refills: 0 | Status: COMPLETED | OUTPATIENT
Start: 2023-11-10 | End: 2023-11-10

## 2023-11-10 RX ORDER — POTASSIUM CHLORIDE 20 MEQ
20 PACKET (EA) ORAL ONCE
Refills: 0 | Status: COMPLETED | OUTPATIENT
Start: 2023-11-10 | End: 2023-11-10

## 2023-11-10 RX ORDER — TAMSULOSIN HYDROCHLORIDE 0.4 MG/1
0.4 CAPSULE ORAL AT BEDTIME
Refills: 0 | Status: DISCONTINUED | OUTPATIENT
Start: 2023-11-10 | End: 2023-11-14

## 2023-11-10 RX ORDER — ACETAMINOPHEN 500 MG
975 TABLET ORAL EVERY 6 HOURS
Refills: 0 | Status: DISCONTINUED | OUTPATIENT
Start: 2023-11-10 | End: 2023-11-14

## 2023-11-10 RX ORDER — LIDOCAINE HCL 20 MG/ML
30 VIAL (ML) INJECTION ONCE
Refills: 0 | Status: COMPLETED | OUTPATIENT
Start: 2023-11-10 | End: 2023-11-10

## 2023-11-10 RX ADMIN — Medication 975 MILLIGRAM(S): at 13:45

## 2023-11-10 RX ADMIN — HEPARIN SODIUM 7500 UNIT(S): 5000 INJECTION INTRAVENOUS; SUBCUTANEOUS at 18:48

## 2023-11-10 RX ADMIN — HYDROMORPHONE HYDROCHLORIDE 0.5 MILLIGRAM(S): 2 INJECTION INTRAMUSCULAR; INTRAVENOUS; SUBCUTANEOUS at 18:48

## 2023-11-10 RX ADMIN — HYDROMORPHONE HYDROCHLORIDE 0.5 MILLIGRAM(S): 2 INJECTION INTRAMUSCULAR; INTRAVENOUS; SUBCUTANEOUS at 10:37

## 2023-11-10 RX ADMIN — Medication 100 GRAM(S): at 11:43

## 2023-11-10 RX ADMIN — Medication 975 MILLIGRAM(S): at 12:45

## 2023-11-10 RX ADMIN — Medication 20 MILLIEQUIVALENT(S): at 11:44

## 2023-11-10 RX ADMIN — TAMSULOSIN HYDROCHLORIDE 0.4 MILLIGRAM(S): 0.4 CAPSULE ORAL at 21:51

## 2023-11-10 RX ADMIN — HYDROMORPHONE HYDROCHLORIDE 0.5 MILLIGRAM(S): 2 INJECTION INTRAMUSCULAR; INTRAVENOUS; SUBCUTANEOUS at 03:40

## 2023-11-10 RX ADMIN — Medication 5 MILLILITER(S): at 05:54

## 2023-11-10 RX ADMIN — ONDANSETRON 4 MILLIGRAM(S): 8 TABLET, FILM COATED ORAL at 16:59

## 2023-11-10 RX ADMIN — HEPARIN SODIUM 7500 UNIT(S): 5000 INJECTION INTRAVENOUS; SUBCUTANEOUS at 11:44

## 2023-11-10 RX ADMIN — HYDROMORPHONE HYDROCHLORIDE 0.5 MILLIGRAM(S): 2 INJECTION INTRAMUSCULAR; INTRAVENOUS; SUBCUTANEOUS at 19:18

## 2023-11-10 RX ADMIN — HEPARIN SODIUM 7500 UNIT(S): 5000 INJECTION INTRAVENOUS; SUBCUTANEOUS at 03:09

## 2023-11-10 RX ADMIN — HYDROMORPHONE HYDROCHLORIDE 0.5 MILLIGRAM(S): 2 INJECTION INTRAMUSCULAR; INTRAVENOUS; SUBCUTANEOUS at 03:09

## 2023-11-10 RX ADMIN — HYDROMORPHONE HYDROCHLORIDE 0.5 MILLIGRAM(S): 2 INJECTION INTRAMUSCULAR; INTRAVENOUS; SUBCUTANEOUS at 14:00

## 2023-11-10 RX ADMIN — HYDROMORPHONE HYDROCHLORIDE 0.5 MILLIGRAM(S): 2 INJECTION INTRAMUSCULAR; INTRAVENOUS; SUBCUTANEOUS at 09:37

## 2023-11-10 RX ADMIN — HYDROMORPHONE HYDROCHLORIDE 0.5 MILLIGRAM(S): 2 INJECTION INTRAMUSCULAR; INTRAVENOUS; SUBCUTANEOUS at 15:00

## 2023-11-10 NOTE — CONSULT NOTE ADULT - SUBJECTIVE AND OBJECTIVE BOX
HPI:  63 year old male with know history of splenic flexure and descending colon adenocarcinoma (Aug 2023 by colonoscopy) know to Dr. Montague, HTN, HLD, DMII ?, morbid obesity referred to [facility] ED by PCP for increasing abdominal pain, bloody bowel movements, increasing abdominal bloating and distension. Patient refers that over the past couple of days he has been experiencing crampy lower abdominal pain with increasing feeling of bloating. Refers that he was being worked up for colon malignancy after he started to notice blood on the toilet bowl after bowel movements and while wiping. Today he refers intermittent, dull lower abdominal pain not associated with nausea, vomiting, blood per rectum, melena. Denies any chest pain, shortness of breathe, cough, urinary symptoms.     In the ED patient seen in no acute distress, with complaints of mild abdominal pain.   On exam, vital signs are stable, abdomen is soft, morbidly obese, non distended, mildly tender to palpation in left hemiabdomen, there is absence of rebound, guarding.   Lab values within normal limits     PMH: descending colon adenocarcinoma, HTN, HLD, DMII ?  PSH: denies  Social: Former smoker (quit 8 years ago)  Allergies: denies	  Fam: No family history of cancer 		  Scopes: C-scope 2 months ago - dx of colon cancer   Meds:  Vit D  (07 Nov 2023 13:54)       ADDENDUM: HPI as per above. 64 y/o male with history of HTN, HLD, DM, MO, colon CA s/p lap converted to open extended left hemicolectomy 11/9. Urology was consulted for hypospadias noted when placing rosario intraoperatively. Patient reports he has never been evaluated by a urologist before. Denies issues with urination, straining to urinate, incomplete emptying. The hypospadias does not bother him from an aesthetic standpoint. Denies curvature with erections. He is not interested in any interventions for the hypospadias at this time.     PAST MEDICAL & SURGICAL HISTORY:  Malignant neoplasm of colon      HTN (hypertension)      DMII (diabetes mellitus, type 2)      CAD (coronary artery disease)      HLD (hyperlipidemia)      No significant past surgical history          MEDICATIONS  (STANDING):  dextrose 5%. 1000 milliLiter(s) (50 mL/Hr) IV Continuous <Continuous>  dextrose 5%. 1000 milliLiter(s) (100 mL/Hr) IV Continuous <Continuous>  dextrose 50% Injectable 12.5 Gram(s) IV Push once  dextrose 50% Injectable 25 Gram(s) IV Push once  dextrose 50% Injectable 25 Gram(s) IV Push once  glucagon  Injectable 1 milliGRAM(s) IntraMuscular once  heparin   Injectable 7500 Unit(s) SubCutaneous every 8 hours  influenza   Vaccine 0.5 milliLiter(s) IntraMuscular once  insulin lispro (ADMELOG) corrective regimen sliding scale   SubCutaneous three times a day before meals  insulin lispro (ADMELOG) corrective regimen sliding scale   SubCutaneous at bedtime  lactated ringers. 1000 milliLiter(s) (40 mL/Hr) IV Continuous <Continuous>  potassium chloride  10 mEq/100 mL IVPB 10 milliEquivalent(s) IV Intermittent every 1 hour    MEDICATIONS  (PRN):  acetaminophen     Tablet .. 975 milliGRAM(s) Oral every 6 hours PRN Mild Pain (1 - 3)  dextrose Oral Gel 15 Gram(s) Oral once PRN Blood Glucose LESS THAN 70 milliGRAM(s)/deciliter  HYDROmorphone  Injectable 0.5 milliGRAM(s) IV Push every 30 minutes PRN breakthrough pain in the PACU  HYDROmorphone  Injectable 0.5 milliGRAM(s) IV Push every 4 hours PRN Severe Pain (7 - 10)  HYDROmorphone  Injectable 0.25 milliGRAM(s) IV Push every 4 hours PRN Moderate Pain (4 - 6)  ondansetron Injectable 4 milliGRAM(s) IV Push every 8 hours PRN Nausea and/or Vomiting      Allergies    No Known Allergies    Intolerances        SOCIAL HISTORY:    FAMILY HISTORY:  No pertinent family history in first degree relatives        Vital Signs Last 24 Hrs  T(C): 37 (10 Nov 2023 09:25), Max: 37.4 (10 Nov 2023 05:15)  T(F): 98.6 (10 Nov 2023 09:25), Max: 99.3 (10 Nov 2023 05:15)  HR: 75 (10 Nov 2023 09:25) (67 - 88)  BP: 138/74 (10 Nov 2023 09:25) (105/66 - 138/74)  BP(mean): 79 (10 Nov 2023 00:10) (79 - 91)  RR: 18 (10 Nov 2023 09:25) (11 - 19)  SpO2: 95% (10 Nov 2023 09:25) (93% - 100%)    Parameters below as of 10 Nov 2023 09:25  Patient On (Oxygen Delivery Method): room air        PHYSICAL EXAM  General: NAD, resting comfortably in bed  C/V: NSR  Pulm: Nonlabored breathing, no respiratory distress on room air  Abd: soft, ND/NT, no rebound tenderness, no guarding  : Distal hypospadias  Extrem: WWP, no edema, SCDs in place      LABS:                        12.0   13.69 )-----------( 274      ( 10 Nov 2023 05:30 )             38.4     11-10    141  |  104  |  9   ----------------------------<  120<H>  3.7   |  23  |  1.17    Ca    8.2<L>      10 Nov 2023 05:30  Phos  3.7     11-10  Mg     1.8     11-10        Urinalysis Basic - ( 10 Nov 2023 05:30 )    Color: x / Appearance: x / SG: x / pH: x  Gluc: 120 mg/dL / Ketone: x  / Bili: x / Urobili: x   Blood: x / Protein: x / Nitrite: x   Leuk Esterase: x / RBC: x / WBC x   Sq Epi: x / Non Sq Epi: x / Bacteria: x        RADIOLOGY & ADDITIONAL STUDIES:

## 2023-11-10 NOTE — PROGRESS NOTE ADULT - ASSESSMENT
63 year old male with know history of splenic flexure and descending colon adenocarcinoma (Aug 2023 by colonoscopy) know to Dr. Montague, HTN morbid obesity presented after evaluation by PCP due to complaints of dyspepsia, hematochezia, and abdominal pain with bloating; patient was in process of colon cancer evaluation. Now has c/o abdominal pain, BRBPR, hematochezia persists     patient non compliant with anti HTN medications, if needed can place on norvasc with holding parameters post op   pain regiment post op, DVT ppx, anti emetics PRN, ambulate monitor for BMs    CXR unremarkable, labs/ EKG reviewed  patient with METS > 4, no SOB / CP with moderate daily activities   Ángel cardiac risk of 0.2%   patient is a class III RCI score risk, 2 points for elevated risk surgery, Q Waves present on EKG     63 year old male with know history of splenic flexure and descending colon adenocarcinoma (Aug 2023 by colonoscopy) know to Dr. Montague, HTN morbid obesity presented after evaluation by PCP due to complaints of dyspepsia, hematochezia, and abdominal pain with bloating; patient was in process of colon cancer evaluation. Now has c/o abdominal pain, BRBPR, hematochezia persists     patient non compliant with anti HTN medications, if needed can place on norvasc with holding parameters post op   pain regiment post op, DVT ppx, anti emetics PRN, ambulate monitor for BMs  normocytic anemia exacerbated post op, afebrile leukocytosis with improvement   CXR unremarkable, labs/ EKG reviewed  patient with METS > 4, no SOB / CP with moderate daily activities   Ángel cardiac risk of 0.2%   patient is a class III RCI score risk, 2 points for elevated risk surgery, Q Waves present on EKG

## 2023-11-10 NOTE — DISCHARGE NOTE PROVIDER - CARE PROVIDER_API CALL
Galileo Montague  Surgery  [facility] [address] [place] [address], [address]  [place] [address]  Phone: [phone]  Fax: [phone]  Follow Up Time: 1 week

## 2023-11-10 NOTE — PROGRESS NOTE ADULT - SUBJECTIVE AND OBJECTIVE BOX
SUBJECTIVE: Pt seen and examined at bedside. C/o mild abdominal pain, denies N/V, CP/SOB. Tolerating CLD.      MEDICATIONS  (STANDING):  dextrose 5%. 1000 milliLiter(s) (50 mL/Hr) IV Continuous <Continuous>  dextrose 5%. 1000 milliLiter(s) (100 mL/Hr) IV Continuous <Continuous>  dextrose 50% Injectable 12.5 Gram(s) IV Push once  dextrose 50% Injectable 25 Gram(s) IV Push once  dextrose 50% Injectable 25 Gram(s) IV Push once  glucagon  Injectable 1 milliGRAM(s) IntraMuscular once  heparin   Injectable 7500 Unit(s) SubCutaneous every 8 hours  influenza   Vaccine 0.5 milliLiter(s) IntraMuscular once  insulin lispro (ADMELOG) corrective regimen sliding scale   SubCutaneous three times a day before meals  insulin lispro (ADMELOG) corrective regimen sliding scale   SubCutaneous at bedtime  lactated ringers. 1000 milliLiter(s) (40 mL/Hr) IV Continuous <Continuous>  potassium chloride  10 mEq/100 mL IVPB 10 milliEquivalent(s) IV Intermittent every 1 hour    MEDICATIONS  (PRN):  acetaminophen     Tablet .. 650 milliGRAM(s) Oral every 6 hours PRN Temp greater or equal to 38C (100.4F), Mild Pain (1 - 3)  dextrose Oral Gel 15 Gram(s) Oral once PRN Blood Glucose LESS THAN 70 milliGRAM(s)/deciliter  HYDROmorphone  Injectable 0.5 milliGRAM(s) IV Push every 30 minutes PRN breakthrough pain in the PACU  HYDROmorphone  Injectable 0.5 milliGRAM(s) IV Push every 4 hours PRN Severe Pain (7 - 10)  HYDROmorphone  Injectable 0.25 milliGRAM(s) IV Push every 4 hours PRN Moderate Pain (4 - 6)  ondansetron Injectable 4 milliGRAM(s) IV Push every 8 hours PRN Nausea and/or Vomiting      Vital Signs Last 24 Hrs  T(C): 37.4 (10 Nov 2023 05:15), Max: 37.4 (10 Nov 2023 05:15)  T(F): 99.3 (10 Nov 2023 05:15), Max: 99.3 (10 Nov 2023 05:15)  HR: 79 (10 Nov 2023 05:15) (66 - 88)  BP: 128/73 (10 Nov 2023 05:15) (105/66 - 145/73)  BP(mean): 79 (10 Nov 2023 00:10) (79 - 94)  RR: 17 (10 Nov 2023 05:15) (11 - 19)  SpO2: 93% (10 Nov 2023 05:15) (93% - 100%)    Parameters below as of 10 Nov 2023 05:15  Patient On (Oxygen Delivery Method): room air        PHYSICAL EXAM:  General: NAD, pt resting comfortably in bed  Pulm: No respiratory distress, nonlabored breathing, on room air  CVS: NSR, HDS  Abd: Soft, appropriately TTP, ND. abd binder in place. Incision c/d/i  : Michael in place with yellow urine.  Extremities: WWP, no edema                I&O's Detail    09 Nov 2023 07:01  -  10 Nov 2023 07:00  --------------------------------------------------------  IN:    Lactated Ringers: 440 mL  Total IN: 440 mL    OUT:    Indwelling Catheter - Urethral (mL): 950 mL  Total OUT: 950 mL    Total NET: -510 mL          LABS:                        12.0   19.24 )-----------( 268      ( 09 Nov 2023 20:45 )             37.9     11-09    137  |  104  |  8   ----------------------------<  158<H>  4.0   |  21<L>  |  1.10    Ca    8.1<L>      09 Nov 2023 20:45  Phos  3.4     11-09  Mg     2.3     11-09        Urinalysis Basic - ( 09 Nov 2023 20:45 )    Color: x / Appearance: x / SG: x / pH: x  Gluc: 158 mg/dL / Ketone: x  / Bili: x / Urobili: x   Blood: x / Protein: x / Nitrite: x   Leuk Esterase: x / RBC: x / WBC x   Sq Epi: x / Non Sq Epi: x / Bacteria: x        RADIOLOGY & ADDITIONAL STUDIES:

## 2023-11-10 NOTE — DISCHARGE NOTE PROVIDER - NSDCFUADDINST_GEN_ALL_CORE_FT
Follow up with Dr. Montague in 1-2 weeks. Call the office at the number below to schedule your appointment. You may shower; soap and water over incision sites. Do not scrub. Pat dry when done. No tub bathing or swimming until cleared. Keep incision sites out of the sun as scars will darken. Ambulate as tolerated, but no heavy lifting (>10lbs) or strenuous exercise. You may resume _____ diet. You should be urinating at least 3-4x per day. Call the office if you experience increasing abdominal pain, nausea, vomiting, or temperature >101 F.    Warning Signs:  Please call your doctor or nurse practitioner if you experience the following:  *You experience new chest pain, pressure, squeezing or tightness.  *New or worsening cough, shortness of breath, or wheeze.  *If you are vomiting and cannot keep down fluids or your medications.  *You are getting dehydrated due to continued vomiting, diarrhea, or other reasons. Signs of dehydration include dry mouth, rapid heartbeat, or feeling dizzy or faint when standing.  *You see blood or dark/black material when you vomit or have a bowel movement.  *You experience burning when you urinate, have blood in your urine, or experience a discharge.  *Your pain is not improving within 8-12 hours or is not gone within 24 hours. Call or return immediately if your pain is getting worse, changes location, or moves to your chest or back.  *You have shaking chills, or fever greater than 101.5 degrees Fahrenheit or 38 degrees Celsius.  *Any change in your symptoms, or any new symptoms that concern you.    ADDITIONAL FOLLOW UP: Follow up with the Urology Clinic. The office is located at [address] [place] [address]. The office phone number is [phone]. Will coordinate follow up upon discharge.  Follow up with Dr. Montague in 1-2 weeks. Call the office at the number below to schedule your appointment. You may shower; soap and water over incision sites. Do not scrub. Pat dry when done. No tub bathing or swimming until cleared. Keep incision sites out of the sun as scars will darken. Ambulate as tolerated, but no heavy lifting (>10lbs) or strenuous exercise. You should be on a consistent carbohydrate diet due to your HgbA1c being in the pre-diabetes range. You should be urinating at least 3-4x per day. Call the office if you experience increasing abdominal pain, nausea, vomiting, or temperature >101 F.    ADDITIONAL FOLLOW UP:   Follow up with the Urology Clinic. The office is located at [address] [place] [address]. The office phone number is [phone].     Warning Signs:  Please call your doctor or nurse practitioner if you experience the following:  *You experience new chest pain, pressure, squeezing or tightness.  *New or worsening cough, shortness of breath, or wheeze.  *If you are vomiting and cannot keep down fluids or your medications.  *You are getting dehydrated due to continued vomiting, diarrhea, or other reasons. Signs of dehydration include dry mouth, rapid heartbeat, or feeling dizzy or faint when standing.  *You see blood or dark/black material when you vomit or have a bowel movement.  *You experience burning when you urinate, have blood in your urine, or experience a discharge.  *Your pain is not improving within 8-12 hours or is not gone within 24 hours. Call or return immediately if your pain is getting worse, changes location, or moves to your chest or back.  *You have shaking chills, or fever greater than 101.5 degrees Fahrenheit or 38 degrees Celsius.  *Any change in your symptoms, or any new symptoms that concern you. Follow up with Dr. Montague in 1-2 weeks. Call the office at the number below to schedule your appointment. You may shower; soap and water over incision sites. Do not scrub. Pat dry when done. No tub bathing or swimming until cleared. Keep incision sites out of the sun as scars will darken. Ambulate as tolerated, but no heavy lifting (>10lbs) or strenuous exercise. You should be on a low fiber, consistent carbohydrate diet due to your HgbA1c being in the pre-diabetes range. You should be urinating at least 3-4x per day. Call the office if you experience increasing abdominal pain, nausea, vomiting, or temperature >101 F.    Please take Amlodipine 5 mg daily. Please follow up with your PCP for optimization and continuation of this medication.      ADDITIONAL FOLLOW UP:   Follow up with the Urology Clinic. The office is located at [address] [place] [address]. The office phone number is [phone].     Warning Signs:  Please call your doctor or nurse practitioner if you experience the following:  *You experience new chest pain, pressure, squeezing or tightness.  *New or worsening cough, shortness of breath, or wheeze.  *If you are vomiting and cannot keep down fluids or your medications.  *You are getting dehydrated due to continued vomiting, diarrhea, or other reasons. Signs of dehydration include dry mouth, rapid heartbeat, or feeling dizzy or faint when standing.  *You see blood or dark/black material when you vomit or have a bowel movement.  *You experience burning when you urinate, have blood in your urine, or experience a discharge.  *Your pain is not improving within 8-12 hours or is not gone within 24 hours. Call or return immediately if your pain is getting worse, changes location, or moves to your chest or back.  *You have shaking chills, or fever greater than 101.5 degrees Fahrenheit or 38 degrees Celsius.  *Any change in your symptoms, or any new symptoms that concern you.    Please continue a LOW-FIBER DIET. Listed below are some foods you may eat and those you should avoid.   --Allowed foods:  White bread without nuts and seeds  White rice, plain white pasta, and crackers  Refined hot cereals, such as Cream of Wheat, or cold cereals with less than 1 gram of fiber per serving  Pancakes or waffles made from white refined flour  Most canned or well-cooked vegetables and fruits without skins or seeds  Fruit and vegetable juice with little or no pulp, fruit-flavored drinks, and flavored doyle  Tender meat, poultry, fish, eggs and tofu  Milk and foods made from milk — such as yogurt, pudding, ice cream, cheeses and sour cream — if tolerated  Butter, margarine, oils and salad dressings without seeds  --Foods to avoid:  Whole-wheat or whole-grain breads, cereals and pasta  Brown or wild rice and other whole grains, such as oats, kasha, barley and quinoa  Dried fruits and prune juice  Raw fruit, including those with seeds, skin or membranes, such as berries  Raw or undercooked vegetables, including corn  Dried beans, peas and lentils  Seeds and nuts and foods containing them, including peanut butter and other nut butters  Coconut  Popcorn  Follow up with Dr. Montague in 1-2 weeks. Call the office at the number below to schedule your appointment. You may shower; soap and water over incision sites. Do not scrub. Pat dry when done. No tub bathing or swimming until cleared. Keep incision sites out of the sun as scars will darken. Ambulate as tolerated, but no heavy lifting (>10lbs) or strenuous exercise. You should be on a low fiber, consistent carbohydrate diet due to your HgbA1c being in the pre-diabetes range. You should be urinating at least 3-4x per day. Call the office if you experience increasing abdominal pain, nausea, vomiting, or temperature >101 F.    Please take Amlodipine 5 mg daily. Please follow up with your PCP for optimization and continuation of this medication.   You may alternate taking Tylenol (do not exceed 4000mg in 1 day) and Ibuprofen (do not exceed 3200mg in 1 day), every 6 hours as needed for mild-moderate pain.    ADDITIONAL FOLLOW UP:   Follow up with the Urology Clinic. The office is located at [address] [place] [address]. The office phone number is [phone].     Warning Signs:  Please call your doctor or nurse practitioner if you experience the following:  *You experience new chest pain, pressure, squeezing or tightness.  *New or worsening cough, shortness of breath, or wheeze.  *If you are vomiting and cannot keep down fluids or your medications.  *You are getting dehydrated due to continued vomiting, diarrhea, or other reasons. Signs of dehydration include dry mouth, rapid heartbeat, or feeling dizzy or faint when standing.  *You see blood or dark/black material when you vomit or have a bowel movement.  *You experience burning when you urinate, have blood in your urine, or experience a discharge.  *Your pain is not improving within 8-12 hours or is not gone within 24 hours. Call or return immediately if your pain is getting worse, changes location, or moves to your chest or back.  *You have shaking chills, or fever greater than 101.5 degrees Fahrenheit or 38 degrees Celsius.  *Any change in your symptoms, or any new symptoms that concern you.    Please continue a LOW-FIBER DIET. Listed below are some foods you may eat and those you should avoid.   --Allowed foods:  White bread without nuts and seeds  White rice, plain white pasta, and crackers  Refined hot cereals, such as Cream of Wheat, or cold cereals with less than 1 gram of fiber per serving  Pancakes or waffles made from white refined flour  Most canned or well-cooked vegetables and fruits without skins or seeds  Fruit and vegetable juice with little or no pulp, fruit-flavored drinks, and flavored doyle  Tender meat, poultry, fish, eggs and tofu  Milk and foods made from milk — such as yogurt, pudding, ice cream, cheeses and sour cream — if tolerated  Butter, margarine, oils and salad dressings without seeds  --Foods to avoid:  Whole-wheat or whole-grain breads, cereals and pasta  Brown or wild rice and other whole grains, such as oats, kasha, barley and quinoa  Dried fruits and prune juice  Raw fruit, including those with seeds, skin or membranes, such as berries  Raw or undercooked vegetables, including corn  Dried beans, peas and lentils  Seeds and nuts and foods containing them, including peanut butter and other nut butters  Coconut  Popcorn

## 2023-11-10 NOTE — DISCHARGE NOTE PROVIDER - NSDCCPCAREPLAN_GEN_ALL_CORE_FT
PRINCIPAL DISCHARGE DIAGNOSIS  Diagnosis: Colon cancer  Assessment and Plan of Treatment:       SECONDARY DISCHARGE DIAGNOSES  Diagnosis: Hypertension  Assessment and Plan of Treatment:     Diagnosis: Hyperlipidemia  Assessment and Plan of Treatment:     Diagnosis: Prediabetes  Assessment and Plan of Treatment:      PRINCIPAL DISCHARGE DIAGNOSIS  Diagnosis: Colon cancer  Assessment and Plan of Treatment:       SECONDARY DISCHARGE DIAGNOSES  Diagnosis: Hypertension  Assessment and Plan of Treatment:     Diagnosis: Hyperlipidemia  Assessment and Plan of Treatment:     Diagnosis: Prediabetes  Assessment and Plan of Treatment:     Diagnosis: Morbid obesity  Assessment and Plan of Treatment:     Diagnosis: Hypospadias  Assessment and Plan of Treatment:

## 2023-11-10 NOTE — CONSULT NOTE ADULT - ASSESSMENT
62 y/o male with history of HTN, HLD, DM, MO, colon CA s/p lap converted to open extended left hemicolectomy 11/9. Urology was consulted for hypospadias noted when placing rosario intraoperatively. Patient reports he has never been evaluated by a urologist before. Denies issues with urination, straining to urinate, incomplete emptying. The hypospadias does not bother him from an aesthetic standpoint. Denies curvature with erections. He is not interested in any interventions for the hypospadias at this time.     Recommendations:  -Patient may follow up as needed with the Urology Clinic. The office is located at [address] [place] [address]. The office phone number is [phone]. Will coordinate follow up upon discharge.   -He is asymptomatic from the hypospadias, and reconstruction can be pursued on an elective basis  -Appreciate excellent care per primary team  -Discussed with attending

## 2023-11-10 NOTE — PROGRESS NOTE ADULT - ASSESSMENT
62yo male with known hx of splenic flexure and descending colon adenocarcinoma (8/2023 by colonoscopy), HTN, HLD, DMII, morbid obesity referred to [facility] ED by PCP for increasing abdominal pain, bloody bowel movements, increasing abdominal bloating and distension. Patient refers that over the past couple of days he has been experiencing crampy lower abdominal pain with increasing feeling of bloating. States he was being worked up for colon malignancy after he started to notice blood on the toilet after bowel movements and while wiping. Today he refers intermittent, dull lower abdominal pain not associated with nausea, vomiting, blood per rectum, melena. S/p Extended L hemicolectomy (11/9)    CLD/ IVF  Pain/nausea control prn  Penrose  SQH/SCDs  IS/OOB  ISS  AM labs  f/u Urology consult

## 2023-11-10 NOTE — PROGRESS NOTE ADULT - SUBJECTIVE AND OBJECTIVE BOX
63 year old male with know history of splenic flexure and descending colon adenocarcinoma (Aug 2023 by colonoscopy) know to Dr. Montague, HTN, HLD, DMII, morbid obesity presented after evaluation by PCP due to complaints of dyspepsia, hematochezia, and abdominal pain with bloating; patient was in process of colon cancer evaluation. Now has c/o abdominal pain, BRBPR, hematochezia persists, denies current N/V. Patient denies history of HLD DMII or CAD   OR today for hemicolectomy     patient seen and examined at bedside  resting comfortably without overnight events, with complaints of abdominal pain however and difficulty ambulating due to pain  discussed with residents on team 5     ALL 12 SYSTEMS Reviewed and negative except for HPI      Vital Signs Last 24 Hrs  T(C): 37.1 (10 Nov 2023 14:02), Max: 37.4 (10 Nov 2023 05:15)  T(F): 98.7 (10 Nov 2023 14:02), Max: 99.3 (10 Nov 2023 05:15)  HR: 78 (10 Nov 2023 14:02) (67 - 88)  BP: 153/84 (10 Nov 2023 14:02) (105/66 - 153/84)  BP(mean): 79 (10 Nov 2023 00:10) (79 - 91)  RR: 17 (10 Nov 2023 14:02) (11 - 19)  SpO2: 95% (10 Nov 2023 14:02) (93% - 100%)    Parameters below as of 10 Nov 2023 14:02  Patient On (Oxygen Delivery Method): room air    MEDICATIONS  (STANDING):  dextrose 5%. 1000 milliLiter(s) (50 mL/Hr) IV Continuous <Continuous>  dextrose 5%. 1000 milliLiter(s) (100 mL/Hr) IV Continuous <Continuous>  dextrose 50% Injectable 12.5 Gram(s) IV Push once  dextrose 50% Injectable 25 Gram(s) IV Push once  dextrose 50% Injectable 25 Gram(s) IV Push once  glucagon  Injectable 1 milliGRAM(s) IntraMuscular once  heparin   Injectable 7500 Unit(s) SubCutaneous every 8 hours  influenza   Vaccine 0.5 milliLiter(s) IntraMuscular once  insulin lispro (ADMELOG) corrective regimen sliding scale   SubCutaneous three times a day before meals  insulin lispro (ADMELOG) corrective regimen sliding scale   SubCutaneous at bedtime  lactated ringers. 1000 milliLiter(s) (40 mL/Hr) IV Continuous <Continuous>  potassium chloride  10 mEq/100 mL IVPB 10 milliEquivalent(s) IV Intermittent every 1 hour    MEDICATIONS  (PRN):  acetaminophen     Tablet .. 975 milliGRAM(s) Oral every 6 hours PRN Mild Pain (1 - 3)  dextrose Oral Gel 15 Gram(s) Oral once PRN Blood Glucose LESS THAN 70 milliGRAM(s)/deciliter  HYDROmorphone  Injectable 0.25 milliGRAM(s) IV Push every 4 hours PRN Moderate Pain (4 - 6)  HYDROmorphone  Injectable 0.5 milliGRAM(s) IV Push every 30 minutes PRN breakthrough pain in the PACU  HYDROmorphone  Injectable 0.5 milliGRAM(s) IV Push every 4 hours PRN Severe Pain (7 - 10)  ondansetron Injectable 4 milliGRAM(s) IV Push every 8 hours PRN Nausea and/or Vomiting        I&O's Summary    07 Nov 2023 07:01  -  08 Nov 2023 07:00  --------------------------------------------------------  IN: 0 mL / OUT: 650 mL / NET: -650 mL    08 Nov 2023 07:01  -  08 Nov 2023 14:28  --------------------------------------------------------  IN: 230 mL / OUT: 0 mL / NET: 230 mL        LABS:                        12.6   7.60  )-----------( 282      ( 08 Nov 2023 05:30 )             40.1     11-08    140  |  107  |  8   ----------------------------<  107<H>  4.2   |  23  |  1.00    Ca    8.4      08 Nov 2023 05:30  Phos  3.4     11-08  Mg     2.4     11-08    TPro  6.9  /  Alb  3.5  /  TBili  0.2  /  DBili  x   /  AST  19  /  ALT  20  /  AlkPhos  82  11-07    LIVER FUNCTIONS - ( 07 Nov 2023 13:48 )  Alb: 3.5 g/dL / Pro: 6.9 g/dL / ALK PHOS: 82 U/L / ALT: 20 U/L / AST: 19 U/L / GGT: x           PT/INR - ( 07 Nov 2023 12:13 )   PT: 11.8 sec;   INR: 1.04          PTT - ( 07 Nov 2023 12:13 )  PTT:29.3 sec  Urinalysis Basic - ( 08 Nov 2023 05:30 )    Color: x / Appearance: x / SG: x / pH: x  Gluc: 107 mg/dL / Ketone: x  / Bili: x / Urobili: x   Blood: x / Protein: x / Nitrite: x   Leuk Esterase: x / RBC: x / WBC x   Sq Epi: x / Non Sq Epi: x / Bacteria: x      CAPILLARY BLOOD GLUCOSE      POCT Blood Glucose.: 98 mg/dL (08 Nov 2023 11:44)  POCT Blood Glucose.: 91 mg/dL (08 Nov 2023 06:07)  POCT Blood Glucose.: 109 mg/dL (07 Nov 2023 21:33)  POCT Blood Glucose.: 97 mg/dL (07 Nov 2023 18:02)      Cultures:      PHYSICAL EXAM:  General: NAD, resting comfortably  HEENT: NC/AT, EOMI, normal hearing, no oral lesions, no LAD, neck supple  Pulmonary: Normal resp effort, CTA-B  Cardiovascular: NSR, no murmurs  Abdominal: Soft, ND/NT, no organomegaly  Groin: Soft, nontender, no ecchymosis/hematoma, no erythema, no edema.  Extremities: (+) DP/PT pulses. FROM, normal strength, no clubbing/cyanosis/erythema/edema  Neuro: A/O x 3, CNs II-XII grossly intact, normal sensation, no focal deficits  Pulses: Palpable distal pulses    RADIOLOGY & ADDITIONAL STUDIES:

## 2023-11-10 NOTE — DISCHARGE NOTE PROVIDER - NSDCMRMEDTOKEN_GEN_ALL_CORE_FT
vitamin E d-alpha 400 intl units oral capsule: orally   Rolling Walker for Ambulation: Use for Ambulation Assistance MDD: 1  vitamin E d-alpha 400 intl units oral capsule: orally   amLODIPine 5 mg oral tablet: 1 tab(s) orally once a day Please follow up with your PCP for optimization and continuation of this medication MDD: 1 tab daily  amLODIPine 5 mg oral tablet: 1 tab(s) orally once a day  Rolling Walker for Ambulation: Use for Ambulation Assistance MDD: 1  vitamin E d-alpha 400 intl units oral capsule: orally

## 2023-11-10 NOTE — DISCHARGE NOTE PROVIDER - HOSPITAL COURSE
62yo male with known hx of splenic flexure and descending colon adenocarcinoma (8/2023 by colonoscopy), HTN, HLD, DMII, morbid obesity referred to [facility] ED by PCP for increasing abdominal pain, bloody bowel movements, increasing abdominal bloating and distension. Patient refers that over the past couple of days he has been experiencing crampy lower abdominal pain with increasing feeling of bloating. States he was being worked up for colon malignancy after he started to notice blood on the toilet after bowel movements and while wiping. Today he refers intermittent, dull lower abdominal pain not associated with nausea, vomiting, blood per rectum, melena. S/p Extended L hemicolectomy (11/9).     Urology was consulted for hypospadias who recommend outpatient follow up, patient was given follow up information. Of note A1C was 5.9 (pre-diabetic range), patient was advised to continue a consistent-carb diet and exercise regularly. 63 year old male with known medical history of splenic flexure and descending colon adenocarcinoma (8/2023 by colonoscopy), HTN, HLD, DMII, morbid obesity referred to [facility] ED by his PCP for increasing abdominal pain and bloating, bloody bowel movements, and distension. Patient refers that over the past couple of days he has been experiencing crampy lower abdominal pain with an increasing feeling of bloating. He states that he was being worked up for colon malignancy after he started to notice blood with his bowel movements and while wiping. On 11/9 he underwent a laparoscopic converted to open extended left hemicolectomy with end to end anastomosis. Patient's postoperative course was uncomplicated. His diet was advanced as tolerated and his pain was well controlled on medication. On the day of discharge, the patient was deemed stable and ready to return home with a plan to follow up with Dr. Montague as an outpatient. The patient should also follow up with the urology clinic for hypospadias, as needed. The office is located at [address] [place] [address], [place] [address]. The office phone number is [phone]. Of note, the patient's A1C was 5.9 (pre-diabetic range), patient was advised to continue a consistent-carb diet and exercise regularly.

## 2023-11-11 LAB
ANION GAP SERPL CALC-SCNC: 16 MMOL/L — SIGNIFICANT CHANGE UP (ref 5–17)
ANION GAP SERPL CALC-SCNC: 16 MMOL/L — SIGNIFICANT CHANGE UP (ref 5–17)
BUN SERPL-MCNC: 8 MG/DL — SIGNIFICANT CHANGE UP (ref 7–23)
BUN SERPL-MCNC: 8 MG/DL — SIGNIFICANT CHANGE UP (ref 7–23)
CALCIUM SERPL-MCNC: 8.8 MG/DL — SIGNIFICANT CHANGE UP (ref 8.4–10.5)
CALCIUM SERPL-MCNC: 8.8 MG/DL — SIGNIFICANT CHANGE UP (ref 8.4–10.5)
CHLORIDE SERPL-SCNC: 99 MMOL/L — SIGNIFICANT CHANGE UP (ref 96–108)
CHLORIDE SERPL-SCNC: 99 MMOL/L — SIGNIFICANT CHANGE UP (ref 96–108)
CO2 SERPL-SCNC: 22 MMOL/L — SIGNIFICANT CHANGE UP (ref 22–31)
CO2 SERPL-SCNC: 22 MMOL/L — SIGNIFICANT CHANGE UP (ref 22–31)
CREAT SERPL-MCNC: 1 MG/DL — SIGNIFICANT CHANGE UP (ref 0.5–1.3)
CREAT SERPL-MCNC: 1 MG/DL — SIGNIFICANT CHANGE UP (ref 0.5–1.3)
EGFR: 85 ML/MIN/1.73M2 — SIGNIFICANT CHANGE UP
EGFR: 85 ML/MIN/1.73M2 — SIGNIFICANT CHANGE UP
GLUCOSE BLDC GLUCOMTR-MCNC: 105 MG/DL — HIGH (ref 70–99)
GLUCOSE BLDC GLUCOMTR-MCNC: 105 MG/DL — HIGH (ref 70–99)
GLUCOSE BLDC GLUCOMTR-MCNC: 126 MG/DL — HIGH (ref 70–99)
GLUCOSE BLDC GLUCOMTR-MCNC: 126 MG/DL — HIGH (ref 70–99)
GLUCOSE BLDC GLUCOMTR-MCNC: 135 MG/DL — HIGH (ref 70–99)
GLUCOSE BLDC GLUCOMTR-MCNC: 135 MG/DL — HIGH (ref 70–99)
GLUCOSE BLDC GLUCOMTR-MCNC: 99 MG/DL — SIGNIFICANT CHANGE UP (ref 70–99)
GLUCOSE BLDC GLUCOMTR-MCNC: 99 MG/DL — SIGNIFICANT CHANGE UP (ref 70–99)
GLUCOSE SERPL-MCNC: 154 MG/DL — HIGH (ref 70–99)
GLUCOSE SERPL-MCNC: 154 MG/DL — HIGH (ref 70–99)
HCT VFR BLD CALC: 39.7 % — SIGNIFICANT CHANGE UP (ref 39–50)
HCT VFR BLD CALC: 39.7 % — SIGNIFICANT CHANGE UP (ref 39–50)
HGB BLD-MCNC: 12.6 G/DL — LOW (ref 13–17)
HGB BLD-MCNC: 12.6 G/DL — LOW (ref 13–17)
MAGNESIUM SERPL-MCNC: 2.1 MG/DL — SIGNIFICANT CHANGE UP (ref 1.6–2.6)
MAGNESIUM SERPL-MCNC: 2.1 MG/DL — SIGNIFICANT CHANGE UP (ref 1.6–2.6)
MCHC RBC-ENTMCNC: 26.3 PG — LOW (ref 27–34)
MCHC RBC-ENTMCNC: 26.3 PG — LOW (ref 27–34)
MCHC RBC-ENTMCNC: 31.7 GM/DL — LOW (ref 32–36)
MCHC RBC-ENTMCNC: 31.7 GM/DL — LOW (ref 32–36)
MCV RBC AUTO: 82.9 FL — SIGNIFICANT CHANGE UP (ref 80–100)
MCV RBC AUTO: 82.9 FL — SIGNIFICANT CHANGE UP (ref 80–100)
NRBC # BLD: 0 /100 WBCS — SIGNIFICANT CHANGE UP (ref 0–0)
NRBC # BLD: 0 /100 WBCS — SIGNIFICANT CHANGE UP (ref 0–0)
PHOSPHATE SERPL-MCNC: 2.5 MG/DL — SIGNIFICANT CHANGE UP (ref 2.5–4.5)
PHOSPHATE SERPL-MCNC: 2.5 MG/DL — SIGNIFICANT CHANGE UP (ref 2.5–4.5)
PLATELET # BLD AUTO: 322 K/UL — SIGNIFICANT CHANGE UP (ref 150–400)
PLATELET # BLD AUTO: 322 K/UL — SIGNIFICANT CHANGE UP (ref 150–400)
POTASSIUM SERPL-MCNC: 3.6 MMOL/L — SIGNIFICANT CHANGE UP (ref 3.5–5.3)
POTASSIUM SERPL-MCNC: 3.6 MMOL/L — SIGNIFICANT CHANGE UP (ref 3.5–5.3)
POTASSIUM SERPL-SCNC: 3.6 MMOL/L — SIGNIFICANT CHANGE UP (ref 3.5–5.3)
POTASSIUM SERPL-SCNC: 3.6 MMOL/L — SIGNIFICANT CHANGE UP (ref 3.5–5.3)
RBC # BLD: 4.79 M/UL — SIGNIFICANT CHANGE UP (ref 4.2–5.8)
RBC # BLD: 4.79 M/UL — SIGNIFICANT CHANGE UP (ref 4.2–5.8)
RBC # FLD: 23.2 % — HIGH (ref 10.3–14.5)
RBC # FLD: 23.2 % — HIGH (ref 10.3–14.5)
SODIUM SERPL-SCNC: 137 MMOL/L — SIGNIFICANT CHANGE UP (ref 135–145)
SODIUM SERPL-SCNC: 137 MMOL/L — SIGNIFICANT CHANGE UP (ref 135–145)
WBC # BLD: 16.25 K/UL — HIGH (ref 3.8–10.5)
WBC # BLD: 16.25 K/UL — HIGH (ref 3.8–10.5)
WBC # FLD AUTO: 16.25 K/UL — HIGH (ref 3.8–10.5)
WBC # FLD AUTO: 16.25 K/UL — HIGH (ref 3.8–10.5)

## 2023-11-11 RX ORDER — AMLODIPINE BESYLATE 2.5 MG/1
2.5 TABLET ORAL DAILY
Refills: 0 | Status: DISCONTINUED | OUTPATIENT
Start: 2023-11-11 | End: 2023-11-13

## 2023-11-11 RX ORDER — POTASSIUM PHOSPHATE, MONOBASIC POTASSIUM PHOSPHATE, DIBASIC 236; 224 MG/ML; MG/ML
15 INJECTION, SOLUTION INTRAVENOUS ONCE
Refills: 0 | Status: COMPLETED | OUTPATIENT
Start: 2023-11-11 | End: 2023-11-11

## 2023-11-11 RX ORDER — KETOROLAC TROMETHAMINE 30 MG/ML
15 SYRINGE (ML) INJECTION EVERY 6 HOURS
Refills: 0 | Status: DISCONTINUED | OUTPATIENT
Start: 2023-11-11 | End: 2023-11-13

## 2023-11-11 RX ADMIN — HEPARIN SODIUM 7500 UNIT(S): 5000 INJECTION INTRAVENOUS; SUBCUTANEOUS at 02:15

## 2023-11-11 RX ADMIN — HEPARIN SODIUM 7500 UNIT(S): 5000 INJECTION INTRAVENOUS; SUBCUTANEOUS at 19:11

## 2023-11-11 RX ADMIN — TAMSULOSIN HYDROCHLORIDE 0.4 MILLIGRAM(S): 0.4 CAPSULE ORAL at 22:36

## 2023-11-11 RX ADMIN — HEPARIN SODIUM 7500 UNIT(S): 5000 INJECTION INTRAVENOUS; SUBCUTANEOUS at 10:41

## 2023-11-11 RX ADMIN — AMLODIPINE BESYLATE 2.5 MILLIGRAM(S): 2.5 TABLET ORAL at 17:06

## 2023-11-11 RX ADMIN — ONDANSETRON 4 MILLIGRAM(S): 8 TABLET, FILM COATED ORAL at 12:07

## 2023-11-11 RX ADMIN — POTASSIUM PHOSPHATE, MONOBASIC POTASSIUM PHOSPHATE, DIBASIC 62.5 MILLIMOLE(S): 236; 224 INJECTION, SOLUTION INTRAVENOUS at 10:41

## 2023-11-11 NOTE — PROGRESS NOTE ADULT - ASSESSMENT
64yo male with known hx of splenic flexure and descending colon adenocarcinoma (8/2023 by colonoscopy), HTN, HLD, DMII, morbid obesity referred to [facility] ED by PCP for increasing abdominal pain, bloody bowel movements, increasing abdominal bloating and distension. Patient refers that over the past couple of days he has been experiencing crampy lower abdominal pain with increasing feeling of bloating. States he was being worked up for colon malignancy after he started to notice blood on the toilet after bowel movements and while wiping. Today he refers intermittent, dull lower abdominal pain not associated with nausea, vomiting, blood per rectum, melena. S/p Extended L hemicolectomy (11/9)    CLD/IVF  Pain/nausea control prn  Penrose  SQH/SCDs  IS/OOBA  ISS  AM labs  f/u Urology recs

## 2023-11-11 NOTE — PROGRESS NOTE ADULT - SUBJECTIVE AND OBJECTIVE BOX
63 year old male with know history of splenic flexure and descending colon adenocarcinoma (Aug 2023 by colonoscopy) know to Dr. Montague, HTN, HLD, DMII, morbid obesity presented after evaluation by PCP due to complaints of dyspepsia, hematochezia, and abdominal pain with bloating; patient was in process of colon cancer evaluation. Now has c/o abdominal pain, BRBPR, hematochezia persists, denies current N/V. Patient denies history of HLD DMII or CAD   OR today for hemicolectomy     patient seen and examined at bedside  abdominal because pain has improved, though still uncomfortable due to not passing  flatus / stool   on CLD     ALL 12 SYSTEMS Reviewed and negative except for HPI        Vital Signs Last 24 Hrs  T(C): 36.9 (11 Nov 2023 08:05), Max: 37.3 (10 Nov 2023 23:46)  T(F): 98.4 (11 Nov 2023 08:05), Max: 99.2 (10 Nov 2023 23:46)  HR: 84 (11 Nov 2023 08:05) (78 - 84)  BP: 162/80 (11 Nov 2023 08:05) (130/81 - 164/81)  BP(mean): 109 (10 Nov 2023 21:02) (109 - 109)  RR: 17 (11 Nov 2023 08:05) (17 - 19)  SpO2: 95% (11 Nov 2023 08:05) (93% - 95%)    Parameters below as of 11 Nov 2023 08:05  Patient On (Oxygen Delivery Method): room air        MEDICATIONS  (STANDING):  dextrose 5%. 1000 milliLiter(s) (50 mL/Hr) IV Continuous <Continuous>  dextrose 5%. 1000 milliLiter(s) (100 mL/Hr) IV Continuous <Continuous>  dextrose 50% Injectable 12.5 Gram(s) IV Push once  dextrose 50% Injectable 25 Gram(s) IV Push once  dextrose 50% Injectable 25 Gram(s) IV Push once  glucagon  Injectable 1 milliGRAM(s) IntraMuscular once  heparin   Injectable 7500 Unit(s) SubCutaneous every 8 hours  influenza   Vaccine 0.5 milliLiter(s) IntraMuscular once  insulin lispro (ADMELOG) corrective regimen sliding scale   SubCutaneous three times a day before meals  insulin lispro (ADMELOG) corrective regimen sliding scale   SubCutaneous at bedtime  lactated ringers. 1000 milliLiter(s) (40 mL/Hr) IV Continuous <Continuous>  potassium chloride  10 mEq/100 mL IVPB 10 milliEquivalent(s) IV Intermittent every 1 hour  tamsulosin 0.4 milliGRAM(s) Oral at bedtime    MEDICATIONS  (PRN):  acetaminophen     Tablet .. 975 milliGRAM(s) Oral every 6 hours PRN Mild Pain (1 - 3)  dextrose Oral Gel 15 Gram(s) Oral once PRN Blood Glucose LESS THAN 70 milliGRAM(s)/deciliter  HYDROmorphone  Injectable 0.5 milliGRAM(s) IV Push every 30 minutes PRN breakthrough pain in the PACU  HYDROmorphone  Injectable 0.5 milliGRAM(s) IV Push every 4 hours PRN Severe Pain (7 - 10)  HYDROmorphone  Injectable 0.25 milliGRAM(s) IV Push every 4 hours PRN Moderate Pain (4 - 6)  ketorolac   Injectable 15 milliGRAM(s) IV Push every 6 hours PRN Moderate Pain (4 - 6)  ondansetron Injectable 4 milliGRAM(s) IV Push every 8 hours PRN Nausea and/or Vomiting      I&O's Summary    07 Nov 2023 07:01  -  08 Nov 2023 07:00  --------------------------------------------------------  IN: 0 mL / OUT: 650 mL / NET: -650 mL    08 Nov 2023 07:01  -  08 Nov 2023 14:28  --------------------------------------------------------  IN: 230 mL / OUT: 0 mL / NET: 230 mL        LABS:                        12.6   7.60  )-----------( 282      ( 08 Nov 2023 05:30 )             40.1     11-08    140  |  107  |  8   ----------------------------<  107<H>  4.2   |  23  |  1.00    Ca    8.4      08 Nov 2023 05:30  Phos  3.4     11-08  Mg     2.4     11-08    TPro  6.9  /  Alb  3.5  /  TBili  0.2  /  DBili  x   /  AST  19  /  ALT  20  /  AlkPhos  82  11-07    LIVER FUNCTIONS - ( 07 Nov 2023 13:48 )  Alb: 3.5 g/dL / Pro: 6.9 g/dL / ALK PHOS: 82 U/L / ALT: 20 U/L / AST: 19 U/L / GGT: x           PT/INR - ( 07 Nov 2023 12:13 )   PT: 11.8 sec;   INR: 1.04          PTT - ( 07 Nov 2023 12:13 )  PTT:29.3 sec  Urinalysis Basic - ( 08 Nov 2023 05:30 )    Color: x / Appearance: x / SG: x / pH: x  Gluc: 107 mg/dL / Ketone: x  / Bili: x / Urobili: x   Blood: x / Protein: x / Nitrite: x   Leuk Esterase: x / RBC: x / WBC x   Sq Epi: x / Non Sq Epi: x / Bacteria: x      CAPILLARY BLOOD GLUCOSE      POCT Blood Glucose.: 98 mg/dL (08 Nov 2023 11:44)  POCT Blood Glucose.: 91 mg/dL (08 Nov 2023 06:07)  POCT Blood Glucose.: 109 mg/dL (07 Nov 2023 21:33)  POCT Blood Glucose.: 97 mg/dL (07 Nov 2023 18:02)      Cultures:      PHYSICAL EXAM:  General: NAD, resting comfortably  HEENT: NC/AT, EOMI, normal hearing, no oral lesions, no LAD, neck supple  Pulmonary: Normal resp effort, CTA-B  Cardiovascular: NSR, no murmurs  Abdominal: Soft, ND/NT, no organomegaly  Groin: Soft, nontender, no ecchymosis/hematoma, no erythema, no edema.  Extremities: (+) DP/PT pulses. FROM, normal strength, no clubbing/cyanosis/erythema/edema  Neuro: A/O x 3, CNs II-XII grossly intact, normal sensation, no focal deficits  Pulses: Palpable distal pulses    RADIOLOGY & ADDITIONAL STUDIES:

## 2023-11-11 NOTE — PROGRESS NOTE ADULT - ASSESSMENT
63 year old male with know history of splenic flexure and descending colon adenocarcinoma (Aug 2023 by colonoscopy) know to Dr. Montague, HTN morbid obesity presented after evaluation by PCP due to complaints of dyspepsia, hematochezia, and abdominal pain with bloating; patient was in process of colon cancer evaluation. Now has c/o abdominal pain, BRBPR, hematochezia persists     patient non compliant with anti HTN medications, if needed can place on norvasc with holding parameters post op   pain regiment post op, DVT ppx, anti emetics PRN, ambulate monitor for BMs  normocytic anemia exacerbated post op, afebrile leukocytosis with improvement   CXR unremarkable, labs/ EKG reviewed  patient with METS > 4, no SOB / CP with moderate daily activities   [place]el cardiac risk of 0.2%   patient is a class III RCI score risk, 2 points for elevated risk surgery, Q Waves present on EKG

## 2023-11-11 NOTE — PROGRESS NOTE ADULT - SUBJECTIVE AND OBJECTIVE BOX
INTERVAL HPI/OVERNIGHT EVENTS:    STATUS POST:      POST OPERATIVE DAY #:     SUBJECTIVE:      heparin   Injectable 7500 Unit(s) SubCutaneous every 8 hours      Vital Signs Last 24 Hrs  T(C): 37.2 (11 Nov 2023 05:11), Max: 37.3 (10 Nov 2023 23:46)  T(F): 99 (11 Nov 2023 05:11), Max: 99.2 (10 Nov 2023 23:46)  HR: 83 (11 Nov 2023 05:11) (75 - 84)  BP: 130/81 (11 Nov 2023 05:11) (130/81 - 164/81)  BP(mean): 109 (10 Nov 2023 21:02) (109 - 109)  RR: 18 (11 Nov 2023 05:11) (17 - 19)  SpO2: 94% (11 Nov 2023 05:11) (93% - 95%)    Parameters below as of 11 Nov 2023 05:11  Patient On (Oxygen Delivery Method): room air      I&O's Detail    10 Nov 2023 07:01  -  11 Nov 2023 07:00  --------------------------------------------------------  IN:    Lactated Ringers: 440 mL    Oral Fluid: 860 mL  Total IN: 1300 mL    OUT:    Indwelling Catheter - Urethral (mL): 300 mL    Voided (mL): 1560 mL  Total OUT: 1860 mL    Total NET: -560 mL          General: NAD, resting comfortably in bed  C/V: NSR  Pulm: Nonlabored breathing, no respiratory distress  Abd: soft, NT/ND.  Extrem: WWP, no edema, SCDs in place  Drains:  Michael:      LABS:                        12.6   16.25 )-----------( 322      ( 11 Nov 2023 06:06 )             39.7     11-11    137  |  99  |  8   ----------------------------<  154<H>  3.6   |  22  |  1.00    Ca    8.8      11 Nov 2023 06:06  Phos  2.5     11-11  Mg     2.1     11-11        Urinalysis Basic - ( 11 Nov 2023 06:06 )    Color: x / Appearance: x / SG: x / pH: x  Gluc: 154 mg/dL / Ketone: x  / Bili: x / Urobili: x   Blood: x / Protein: x / Nitrite: x   Leuk Esterase: x / RBC: x / WBC x   Sq Epi: x / Non Sq Epi: x / Bacteria: x        RADIOLOGY & ADDITIONAL STUDIES:   INTERVAL HPI/OVERNIGHT EVENTS: PRN toradol started. bilious emesis 1x. +f/-bm, denies nausea.    STATUS POST:  lap converted to open extended left yvrose with end to end anastomosis    POST OPERATIVE DAY #: 2    SUBJECTIVE: Patient seen and examined at bedside with chief resident. Patient states that he is feeling well with no acute complaints. He denies nausea or vomiting and is tolerating his clear liquid diet. He denies passing flatus of having a bowel movement.      heparin   Injectable 7500 Unit(s) SubCutaneous every 8 hours      Vital Signs Last 24 Hrs  T(C): 37.2 (11 Nov 2023 05:11), Max: 37.3 (10 Nov 2023 23:46)  T(F): 99 (11 Nov 2023 05:11), Max: 99.2 (10 Nov 2023 23:46)  HR: 83 (11 Nov 2023 05:11) (75 - 84)  BP: 130/81 (11 Nov 2023 05:11) (130/81 - 164/81)  BP(mean): 109 (10 Nov 2023 21:02) (109 - 109)  RR: 18 (11 Nov 2023 05:11) (17 - 19)  SpO2: 94% (11 Nov 2023 05:11) (93% - 95%)    Parameters below as of 11 Nov 2023 05:11  Patient On (Oxygen Delivery Method): room air      I&O's Detail    10 Nov 2023 07:01  -  11 Nov 2023 07:00  --------------------------------------------------------  IN:    Lactated Ringers: 440 mL    Oral Fluid: 860 mL  Total IN: 1300 mL    OUT:    Indwelling Catheter - Urethral (mL): 300 mL    Voided (mL): 1560 mL  Total OUT: 1860 mL    Total NET: -560 mL          PHYSICAL EXAM:  General: NAD, pt resting comfortably in bed  Pulm: No respiratory distress, nonlabored breathing, on room air  CVS: NSR, HDS  Abd: Soft, appropriately TTP, ND. abd binder in place. Incision c/d/i with penrose in place  : Michael in place with yellow urine.  Extremities: WWP, no edema      LABS:                        12.6   16.25 )-----------( 322      ( 11 Nov 2023 06:06 )             39.7     11-11    137  |  99  |  8   ----------------------------<  154<H>  3.6   |  22  |  1.00    Ca    8.8      11 Nov 2023 06:06  Phos  2.5     11-11  Mg     2.1     11-11        Urinalysis Basic - ( 11 Nov 2023 06:06 )    Color: x / Appearance: x / SG: x / pH: x  Gluc: 154 mg/dL / Ketone: x  / Bili: x / Urobili: x   Blood: x / Protein: x / Nitrite: x   Leuk Esterase: x / RBC: x / WBC x   Sq Epi: x / Non Sq Epi: x / Bacteria: x        RADIOLOGY & ADDITIONAL STUDIES:

## 2023-11-12 LAB
ANION GAP SERPL CALC-SCNC: 11 MMOL/L — SIGNIFICANT CHANGE UP (ref 5–17)
ANION GAP SERPL CALC-SCNC: 11 MMOL/L — SIGNIFICANT CHANGE UP (ref 5–17)
BUN SERPL-MCNC: 10 MG/DL — SIGNIFICANT CHANGE UP (ref 7–23)
BUN SERPL-MCNC: 10 MG/DL — SIGNIFICANT CHANGE UP (ref 7–23)
CALCIUM SERPL-MCNC: 8.3 MG/DL — LOW (ref 8.4–10.5)
CALCIUM SERPL-MCNC: 8.3 MG/DL — LOW (ref 8.4–10.5)
CHLORIDE SERPL-SCNC: 104 MMOL/L — SIGNIFICANT CHANGE UP (ref 96–108)
CHLORIDE SERPL-SCNC: 104 MMOL/L — SIGNIFICANT CHANGE UP (ref 96–108)
CO2 SERPL-SCNC: 23 MMOL/L — SIGNIFICANT CHANGE UP (ref 22–31)
CO2 SERPL-SCNC: 23 MMOL/L — SIGNIFICANT CHANGE UP (ref 22–31)
CREAT SERPL-MCNC: 0.89 MG/DL — SIGNIFICANT CHANGE UP (ref 0.5–1.3)
CREAT SERPL-MCNC: 0.89 MG/DL — SIGNIFICANT CHANGE UP (ref 0.5–1.3)
EGFR: 96 ML/MIN/1.73M2 — SIGNIFICANT CHANGE UP
EGFR: 96 ML/MIN/1.73M2 — SIGNIFICANT CHANGE UP
GLUCOSE BLDC GLUCOMTR-MCNC: 121 MG/DL — HIGH (ref 70–99)
GLUCOSE BLDC GLUCOMTR-MCNC: 121 MG/DL — HIGH (ref 70–99)
GLUCOSE BLDC GLUCOMTR-MCNC: 96 MG/DL — SIGNIFICANT CHANGE UP (ref 70–99)
GLUCOSE BLDC GLUCOMTR-MCNC: 96 MG/DL — SIGNIFICANT CHANGE UP (ref 70–99)
GLUCOSE BLDC GLUCOMTR-MCNC: 97 MG/DL — SIGNIFICANT CHANGE UP (ref 70–99)
GLUCOSE BLDC GLUCOMTR-MCNC: 97 MG/DL — SIGNIFICANT CHANGE UP (ref 70–99)
GLUCOSE BLDC GLUCOMTR-MCNC: 99 MG/DL — SIGNIFICANT CHANGE UP (ref 70–99)
GLUCOSE BLDC GLUCOMTR-MCNC: 99 MG/DL — SIGNIFICANT CHANGE UP (ref 70–99)
GLUCOSE SERPL-MCNC: 100 MG/DL — HIGH (ref 70–99)
GLUCOSE SERPL-MCNC: 100 MG/DL — HIGH (ref 70–99)
HCT VFR BLD CALC: 35.2 % — LOW (ref 39–50)
HCT VFR BLD CALC: 35.2 % — LOW (ref 39–50)
HGB BLD-MCNC: 10.8 G/DL — LOW (ref 13–17)
HGB BLD-MCNC: 10.8 G/DL — LOW (ref 13–17)
MAGNESIUM SERPL-MCNC: 1.9 MG/DL — SIGNIFICANT CHANGE UP (ref 1.6–2.6)
MAGNESIUM SERPL-MCNC: 1.9 MG/DL — SIGNIFICANT CHANGE UP (ref 1.6–2.6)
MCHC RBC-ENTMCNC: 26.1 PG — LOW (ref 27–34)
MCHC RBC-ENTMCNC: 26.1 PG — LOW (ref 27–34)
MCHC RBC-ENTMCNC: 30.7 GM/DL — LOW (ref 32–36)
MCHC RBC-ENTMCNC: 30.7 GM/DL — LOW (ref 32–36)
MCV RBC AUTO: 85 FL — SIGNIFICANT CHANGE UP (ref 80–100)
MCV RBC AUTO: 85 FL — SIGNIFICANT CHANGE UP (ref 80–100)
NRBC # BLD: 0 /100 WBCS — SIGNIFICANT CHANGE UP (ref 0–0)
NRBC # BLD: 0 /100 WBCS — SIGNIFICANT CHANGE UP (ref 0–0)
PHOSPHATE SERPL-MCNC: 2.7 MG/DL — SIGNIFICANT CHANGE UP (ref 2.5–4.5)
PHOSPHATE SERPL-MCNC: 2.7 MG/DL — SIGNIFICANT CHANGE UP (ref 2.5–4.5)
PLATELET # BLD AUTO: 254 K/UL — SIGNIFICANT CHANGE UP (ref 150–400)
PLATELET # BLD AUTO: 254 K/UL — SIGNIFICANT CHANGE UP (ref 150–400)
POTASSIUM SERPL-MCNC: 3.5 MMOL/L — SIGNIFICANT CHANGE UP (ref 3.5–5.3)
POTASSIUM SERPL-MCNC: 3.5 MMOL/L — SIGNIFICANT CHANGE UP (ref 3.5–5.3)
POTASSIUM SERPL-SCNC: 3.5 MMOL/L — SIGNIFICANT CHANGE UP (ref 3.5–5.3)
POTASSIUM SERPL-SCNC: 3.5 MMOL/L — SIGNIFICANT CHANGE UP (ref 3.5–5.3)
RBC # BLD: 4.14 M/UL — LOW (ref 4.2–5.8)
RBC # BLD: 4.14 M/UL — LOW (ref 4.2–5.8)
RBC # FLD: 22.9 % — HIGH (ref 10.3–14.5)
RBC # FLD: 22.9 % — HIGH (ref 10.3–14.5)
SODIUM SERPL-SCNC: 138 MMOL/L — SIGNIFICANT CHANGE UP (ref 135–145)
SODIUM SERPL-SCNC: 138 MMOL/L — SIGNIFICANT CHANGE UP (ref 135–145)
WBC # BLD: 11.46 K/UL — HIGH (ref 3.8–10.5)
WBC # BLD: 11.46 K/UL — HIGH (ref 3.8–10.5)
WBC # FLD AUTO: 11.46 K/UL — HIGH (ref 3.8–10.5)
WBC # FLD AUTO: 11.46 K/UL — HIGH (ref 3.8–10.5)

## 2023-11-12 RX ORDER — MAGNESIUM SULFATE 500 MG/ML
2 VIAL (ML) INJECTION ONCE
Refills: 0 | Status: COMPLETED | OUTPATIENT
Start: 2023-11-12 | End: 2023-11-12

## 2023-11-12 RX ORDER — SODIUM,POTASSIUM PHOSPHATES 278-250MG
1 POWDER IN PACKET (EA) ORAL ONCE
Refills: 0 | Status: COMPLETED | OUTPATIENT
Start: 2023-11-12 | End: 2023-11-12

## 2023-11-12 RX ADMIN — AMLODIPINE BESYLATE 2.5 MILLIGRAM(S): 2.5 TABLET ORAL at 05:01

## 2023-11-12 RX ADMIN — TAMSULOSIN HYDROCHLORIDE 0.4 MILLIGRAM(S): 0.4 CAPSULE ORAL at 22:03

## 2023-11-12 RX ADMIN — HEPARIN SODIUM 7500 UNIT(S): 5000 INJECTION INTRAVENOUS; SUBCUTANEOUS at 19:00

## 2023-11-12 RX ADMIN — Medication 25 GRAM(S): at 08:35

## 2023-11-12 RX ADMIN — HEPARIN SODIUM 7500 UNIT(S): 5000 INJECTION INTRAVENOUS; SUBCUTANEOUS at 04:26

## 2023-11-12 RX ADMIN — Medication 1 PACKET(S): at 08:35

## 2023-11-12 RX ADMIN — HEPARIN SODIUM 7500 UNIT(S): 5000 INJECTION INTRAVENOUS; SUBCUTANEOUS at 11:37

## 2023-11-12 NOTE — DIETITIAN INITIAL EVALUATION ADULT - OTHER INFO
62yo male with known hx of splenic flexure and descending colon adenocarcinoma (8/2023 by colonoscopy), HTN, HLD, DMII, morbid obesity referred to [facility] ED by PCP for increasing abdominal pain, bloody bowel movements, increasing abdominal bloating and distension. S/p Extended L hemicolectomy (11/9), having BM and passing gas, one episode of bilious emesis yesterday. Patient seen in room, awake, alert, very pleasant. Breathing comfortably on room air. Patient reports a good appetite at home, NKFA, good control of BG at home (A1C 5.9%). Advanced to solid PO diet, >65% of dinner consumed without complaints of N/V. +F/-BM. Admits to some incisional pain, and pain with coughing. Skin noted with abdominal surgical wound, no edema. Afebrile. Fiber progression diet ed provided, encouraged protein as tolerated. Will continue to follow per RD protocol.

## 2023-11-12 NOTE — DIETITIAN INITIAL EVALUATION ADULT - NS FNS DIET ORDER
Diet, Regular:   Consistent Carbohydrate {No Snacks} (CSTCHO)  Fiber/Residue Restricted (LOWFIBER) (11-12-23 @ 10:04)

## 2023-11-12 NOTE — PROGRESS NOTE ADULT - ASSESSMENT
64yo male with known hx of splenic flexure and descending colon adenocarcinoma (8/2023 by colonoscopy), HTN, HLD, DMII, morbid obesity referred to [facility] ED by PCP for increasing abdominal pain, bloody bowel movements, increasing abdominal bloating and distension. S/p Extended L hemicolectomy (11/9), having BM and passing gas, one episode of bilious emesis yesterday.                                  Plan   Advance diet to low residue   SQH/SCDs  IS/OOB  ISS  AM labs  Team 5 will follow

## 2023-11-12 NOTE — DIETITIAN INITIAL EVALUATION ADULT - PERTINENT MEDS FT
MEDICATIONS  (STANDING):  amLODIPine   Tablet 2.5 milliGRAM(s) Oral daily  dextrose 5%. 1000 milliLiter(s) (50 mL/Hr) IV Continuous <Continuous>  dextrose 5%. 1000 milliLiter(s) (100 mL/Hr) IV Continuous <Continuous>  dextrose 50% Injectable 12.5 Gram(s) IV Push once  dextrose 50% Injectable 25 Gram(s) IV Push once  dextrose 50% Injectable 25 Gram(s) IV Push once  glucagon  Injectable 1 milliGRAM(s) IntraMuscular once  heparin   Injectable 7500 Unit(s) SubCutaneous every 8 hours  influenza   Vaccine 0.5 milliLiter(s) IntraMuscular once  insulin lispro (ADMELOG) corrective regimen sliding scale   SubCutaneous three times a day before meals  insulin lispro (ADMELOG) corrective regimen sliding scale   SubCutaneous at bedtime  potassium chloride  10 mEq/100 mL IVPB 10 milliEquivalent(s) IV Intermittent every 1 hour  tamsulosin 0.4 milliGRAM(s) Oral at bedtime    MEDICATIONS  (PRN):  acetaminophen     Tablet .. 975 milliGRAM(s) Oral every 6 hours PRN Mild Pain (1 - 3)  dextrose Oral Gel 15 Gram(s) Oral once PRN Blood Glucose LESS THAN 70 milliGRAM(s)/deciliter  HYDROmorphone  Injectable 0.25 milliGRAM(s) IV Push every 4 hours PRN Moderate Pain (4 - 6)  HYDROmorphone  Injectable 0.5 milliGRAM(s) IV Push every 30 minutes PRN breakthrough pain in the PACU  HYDROmorphone  Injectable 0.5 milliGRAM(s) IV Push every 4 hours PRN Severe Pain (7 - 10)  ketorolac   Injectable 15 milliGRAM(s) IV Push every 6 hours PRN Moderate Pain (4 - 6)  ondansetron Injectable 4 milliGRAM(s) IV Push every 8 hours PRN Nausea and/or Vomiting

## 2023-11-12 NOTE — PROGRESS NOTE ADULT - SUBJECTIVE AND OBJECTIVE BOX
STATUS POST:      POST OPERATIVE DAY #:     SUBJECTIVE: Pt seen and examined at bedside this am by surgery team. Tolerating diet, pain well controlled. Denies f/n/v/cp/sob.    MEDICATIONS  (STANDING):  amLODIPine   Tablet 2.5 milliGRAM(s) Oral daily  dextrose 5%. 1000 milliLiter(s) (50 mL/Hr) IV Continuous <Continuous>  dextrose 5%. 1000 milliLiter(s) (100 mL/Hr) IV Continuous <Continuous>  dextrose 50% Injectable 25 Gram(s) IV Push once  dextrose 50% Injectable 12.5 Gram(s) IV Push once  dextrose 50% Injectable 25 Gram(s) IV Push once  glucagon  Injectable 1 milliGRAM(s) IntraMuscular once  heparin   Injectable 7500 Unit(s) SubCutaneous every 8 hours  influenza   Vaccine 0.5 milliLiter(s) IntraMuscular once  insulin lispro (ADMELOG) corrective regimen sliding scale   SubCutaneous three times a day before meals  insulin lispro (ADMELOG) corrective regimen sliding scale   SubCutaneous at bedtime  potassium chloride  10 mEq/100 mL IVPB 10 milliEquivalent(s) IV Intermittent every 1 hour  tamsulosin 0.4 milliGRAM(s) Oral at bedtime    MEDICATIONS  (PRN):  acetaminophen     Tablet .. 975 milliGRAM(s) Oral every 6 hours PRN Mild Pain (1 - 3)  dextrose Oral Gel 15 Gram(s) Oral once PRN Blood Glucose LESS THAN 70 milliGRAM(s)/deciliter  HYDROmorphone  Injectable 0.5 milliGRAM(s) IV Push every 30 minutes PRN breakthrough pain in the PACU  HYDROmorphone  Injectable 0.5 milliGRAM(s) IV Push every 4 hours PRN Severe Pain (7 - 10)  HYDROmorphone  Injectable 0.25 milliGRAM(s) IV Push every 4 hours PRN Moderate Pain (4 - 6)  ketorolac   Injectable 15 milliGRAM(s) IV Push every 6 hours PRN Moderate Pain (4 - 6)  ondansetron Injectable 4 milliGRAM(s) IV Push every 8 hours PRN Nausea and/or Vomiting      Vital Signs Last 24 Hrs  T(C): 36.8 (12 Nov 2023 12:29), Max: 37.6 (12 Nov 2023 04:45)  T(F): 98.3 (12 Nov 2023 12:29), Max: 99.7 (12 Nov 2023 04:45)  HR: 71 (12 Nov 2023 12:29) (71 - 95)  BP: 135/75 (12 Nov 2023 12:29) (128/70 - 167/79)  BP(mean): --  RR: 17 (12 Nov 2023 12:29) (17 - 17)  SpO2: 95% (12 Nov 2023 12:29) (93% - 95%)    Parameters below as of 12 Nov 2023 12:29  Patient On (Oxygen Delivery Method): room air        PHYSICAL EXAM:  General: NAD, pt resting comfortably in bed  Pulm: No respiratory distress, nonlabored breathing, on room air  CVS: NSR, HDS  Abd: Soft, appropriately TTP, ND. abd binder in place. Incision c/d/i  : Voiding freely   Extremities: WWP, no edema          I&O's Detail    11 Nov 2023 07:01  -  12 Nov 2023 07:00  --------------------------------------------------------  IN:    IV PiggyBack: 250 mL    Lactated Ringers: 880 mL  Total IN: 1130 mL    OUT:    Voided (mL): 775 mL  Total OUT: 775 mL    Total NET: 355 mL      12 Nov 2023 07:01  -  12 Nov 2023 13:13  --------------------------------------------------------  IN:    IV PiggyBack: 50 mL  Total IN: 50 mL    OUT:    Voided (mL): 200 mL  Total OUT: 200 mL    Total NET: -150 mL          LABS:                        10.8   11.46 )-----------( 254      ( 12 Nov 2023 07:24 )             35.2     11-12    138  |  104  |  10  ----------------------------<  100<H>  3.5   |  23  |  0.89    Ca    8.3<L>      12 Nov 2023 07:24  Phos  2.7     11-12  Mg     1.9     11-12        Urinalysis Basic - ( 12 Nov 2023 07:24 )    Color: x / Appearance: x / SG: x / pH: x  Gluc: 100 mg/dL / Ketone: x  / Bili: x / Urobili: x   Blood: x / Protein: x / Nitrite: x   Leuk Esterase: x / RBC: x / WBC x   Sq Epi: x / Non Sq Epi: x / Bacteria: x

## 2023-11-12 NOTE — DIETITIAN INITIAL EVALUATION ADULT - OTHER CALCULATIONS
Ideal body weight used for calculations as pt >120% of IBW. Needs estimated for age and adjusted for post-op/wound healing

## 2023-11-12 NOTE — DIETITIAN INITIAL EVALUATION ADULT - PERTINENT LABORATORY DATA
11-12    138  |  104  |  10  ----------------------------<  100<H>  3.5   |  23  |  0.89    Ca    8.3<L>      12 Nov 2023 07:24  Phos  2.7     11-12  Mg     1.9     11-12    POCT Blood Glucose.: 96 mg/dL (11-12-23 @ 16:57)  A1C with Estimated Average Glucose Result: 5.9 % (11-10-23 @ 05:30)

## 2023-11-12 NOTE — DIETITIAN INITIAL EVALUATION ADULT - ADD RECOMMEND
1. Continue current diet and encourage PO intake   2. Monitor lytes and replete prn. POC BG q6hrs   3. Pain and bowel regimens per team   4. Reinforce diet ed

## 2023-11-12 NOTE — PROGRESS NOTE ADULT - SUBJECTIVE AND OBJECTIVE BOX
63 year old male with know history of splenic flexure and descending colon adenocarcinoma (Aug 2023 by colonoscopy) know to Dr. Montague, HTN, HLD, DMII, morbid obesity presented after evaluation by PCP due to complaints of dyspepsia, hematochezia, and abdominal pain with bloating; patient was in process of colon cancer evaluation. Now has c/o abdominal pain, BRBPR, hematochezia persists, denies current N/V. Patient denies history of HLD DMII or CAD   OR today for hemicolectomy     patient seen and examined at bedside  finally passed gas, abdominal pain greatly improved  ambulating in hallway     ALL 12 SYSTEMS Reviewed and negative except for HPI      Vital Signs Last 24 Hrs  T(C): 36.8 (12 Nov 2023 08:35), Max: 37.6 (12 Nov 2023 04:45)  T(F): 98.3 (12 Nov 2023 08:35), Max: 99.7 (12 Nov 2023 04:45)  HR: 78 (12 Nov 2023 08:35) (74 - 95)  BP: 133/84 (12 Nov 2023 08:35) (128/70 - 167/79)  BP(mean): --  RR: 17 (12 Nov 2023 08:35) (17 - 17)  SpO2: 95% (12 Nov 2023 08:35) (93% - 95%)    Parameters below as of 12 Nov 2023 08:35  Patient On (Oxygen Delivery Method): room air    MEDICATIONS  (STANDING):  amLODIPine   Tablet 2.5 milliGRAM(s) Oral daily  dextrose 5%. 1000 milliLiter(s) (50 mL/Hr) IV Continuous <Continuous>  dextrose 5%. 1000 milliLiter(s) (100 mL/Hr) IV Continuous <Continuous>  dextrose 50% Injectable 25 Gram(s) IV Push once  dextrose 50% Injectable 12.5 Gram(s) IV Push once  dextrose 50% Injectable 25 Gram(s) IV Push once  glucagon  Injectable 1 milliGRAM(s) IntraMuscular once  heparin   Injectable 7500 Unit(s) SubCutaneous every 8 hours  influenza   Vaccine 0.5 milliLiter(s) IntraMuscular once  insulin lispro (ADMELOG) corrective regimen sliding scale   SubCutaneous three times a day before meals  insulin lispro (ADMELOG) corrective regimen sliding scale   SubCutaneous at bedtime  potassium chloride  10 mEq/100 mL IVPB 10 milliEquivalent(s) IV Intermittent every 1 hour  tamsulosin 0.4 milliGRAM(s) Oral at bedtime    MEDICATIONS  (PRN):  acetaminophen     Tablet .. 975 milliGRAM(s) Oral every 6 hours PRN Mild Pain (1 - 3)  dextrose Oral Gel 15 Gram(s) Oral once PRN Blood Glucose LESS THAN 70 milliGRAM(s)/deciliter  HYDROmorphone  Injectable 0.5 milliGRAM(s) IV Push every 30 minutes PRN breakthrough pain in the PACU  HYDROmorphone  Injectable 0.5 milliGRAM(s) IV Push every 4 hours PRN Severe Pain (7 - 10)  HYDROmorphone  Injectable 0.25 milliGRAM(s) IV Push every 4 hours PRN Moderate Pain (4 - 6)  ketorolac   Injectable 15 milliGRAM(s) IV Push every 6 hours PRN Moderate Pain (4 - 6)  ondansetron Injectable 4 milliGRAM(s) IV Push every 8 hours PRN Nausea and/or Vomiting      I&O's Summary    07 Nov 2023 07:01  -  08 Nov 2023 07:00  --------------------------------------------------------  IN: 0 mL / OUT: 650 mL / NET: -650 mL    08 Nov 2023 07:01  -  08 Nov 2023 14:28  --------------------------------------------------------  IN: 230 mL / OUT: 0 mL / NET: 230 mL        LABS:                        12.6   7.60  )-----------( 282      ( 08 Nov 2023 05:30 )             40.1     11-08    140  |  107  |  8   ----------------------------<  107<H>  4.2   |  23  |  1.00    Ca    8.4      08 Nov 2023 05:30  Phos  3.4     11-08  Mg     2.4     11-08    TPro  6.9  /  Alb  3.5  /  TBili  0.2  /  DBili  x   /  AST  19  /  ALT  20  /  AlkPhos  82  11-07    LIVER FUNCTIONS - ( 07 Nov 2023 13:48 )  Alb: 3.5 g/dL / Pro: 6.9 g/dL / ALK PHOS: 82 U/L / ALT: 20 U/L / AST: 19 U/L / GGT: x           PT/INR - ( 07 Nov 2023 12:13 )   PT: 11.8 sec;   INR: 1.04          PTT - ( 07 Nov 2023 12:13 )  PTT:29.3 sec  Urinalysis Basic - ( 08 Nov 2023 05:30 )    Color: x / Appearance: x / SG: x / pH: x  Gluc: 107 mg/dL / Ketone: x  / Bili: x / Urobili: x   Blood: x / Protein: x / Nitrite: x   Leuk Esterase: x / RBC: x / WBC x   Sq Epi: x / Non Sq Epi: x / Bacteria: x      CAPILLARY BLOOD GLUCOSE      POCT Blood Glucose.: 98 mg/dL (08 Nov 2023 11:44)  POCT Blood Glucose.: 91 mg/dL (08 Nov 2023 06:07)  POCT Blood Glucose.: 109 mg/dL (07 Nov 2023 21:33)  POCT Blood Glucose.: 97 mg/dL (07 Nov 2023 18:02)      Cultures:      PHYSICAL EXAM:  General: NAD, resting comfortably  HEENT: NC/AT, EOMI, normal hearing, no oral lesions, no LAD, neck supple  Pulmonary: Normal resp effort, CTA-B  Cardiovascular: NSR, no murmurs  Abdominal: Soft, ND/NT, no organomegaly  Groin: Soft, nontender, no ecchymosis/hematoma, no erythema, no edema.  Extremities: (+) DP/PT pulses. FROM, normal strength, no clubbing/cyanosis/erythema/edema  Neuro: A/O x 3, CNs II-XII grossly intact, normal sensation, no focal deficits  Pulses: Palpable distal pulses    RADIOLOGY & ADDITIONAL STUDIES:

## 2023-11-13 LAB
ANION GAP SERPL CALC-SCNC: 13 MMOL/L — SIGNIFICANT CHANGE UP (ref 5–17)
ANION GAP SERPL CALC-SCNC: 13 MMOL/L — SIGNIFICANT CHANGE UP (ref 5–17)
BUN SERPL-MCNC: 12 MG/DL — SIGNIFICANT CHANGE UP (ref 7–23)
BUN SERPL-MCNC: 12 MG/DL — SIGNIFICANT CHANGE UP (ref 7–23)
CALCIUM SERPL-MCNC: 8.2 MG/DL — LOW (ref 8.4–10.5)
CALCIUM SERPL-MCNC: 8.2 MG/DL — LOW (ref 8.4–10.5)
CHLORIDE SERPL-SCNC: 105 MMOL/L — SIGNIFICANT CHANGE UP (ref 96–108)
CHLORIDE SERPL-SCNC: 105 MMOL/L — SIGNIFICANT CHANGE UP (ref 96–108)
CO2 SERPL-SCNC: 23 MMOL/L — SIGNIFICANT CHANGE UP (ref 22–31)
CO2 SERPL-SCNC: 23 MMOL/L — SIGNIFICANT CHANGE UP (ref 22–31)
CREAT SERPL-MCNC: 0.93 MG/DL — SIGNIFICANT CHANGE UP (ref 0.5–1.3)
CREAT SERPL-MCNC: 0.93 MG/DL — SIGNIFICANT CHANGE UP (ref 0.5–1.3)
EGFR: 92 ML/MIN/1.73M2 — SIGNIFICANT CHANGE UP
EGFR: 92 ML/MIN/1.73M2 — SIGNIFICANT CHANGE UP
GLUCOSE BLDC GLUCOMTR-MCNC: 101 MG/DL — HIGH (ref 70–99)
GLUCOSE BLDC GLUCOMTR-MCNC: 101 MG/DL — HIGH (ref 70–99)
GLUCOSE BLDC GLUCOMTR-MCNC: 105 MG/DL — HIGH (ref 70–99)
GLUCOSE BLDC GLUCOMTR-MCNC: 105 MG/DL — HIGH (ref 70–99)
GLUCOSE BLDC GLUCOMTR-MCNC: 109 MG/DL — HIGH (ref 70–99)
GLUCOSE BLDC GLUCOMTR-MCNC: 109 MG/DL — HIGH (ref 70–99)
GLUCOSE BLDC GLUCOMTR-MCNC: 112 MG/DL — HIGH (ref 70–99)
GLUCOSE BLDC GLUCOMTR-MCNC: 112 MG/DL — HIGH (ref 70–99)
GLUCOSE SERPL-MCNC: 115 MG/DL — HIGH (ref 70–99)
GLUCOSE SERPL-MCNC: 115 MG/DL — HIGH (ref 70–99)
HCT VFR BLD CALC: 36.8 % — LOW (ref 39–50)
HCT VFR BLD CALC: 36.8 % — LOW (ref 39–50)
HGB BLD-MCNC: 11.5 G/DL — LOW (ref 13–17)
HGB BLD-MCNC: 11.5 G/DL — LOW (ref 13–17)
MAGNESIUM SERPL-MCNC: 2.1 MG/DL — SIGNIFICANT CHANGE UP (ref 1.6–2.6)
MAGNESIUM SERPL-MCNC: 2.1 MG/DL — SIGNIFICANT CHANGE UP (ref 1.6–2.6)
MCHC RBC-ENTMCNC: 26.1 PG — LOW (ref 27–34)
MCHC RBC-ENTMCNC: 26.1 PG — LOW (ref 27–34)
MCHC RBC-ENTMCNC: 31.3 GM/DL — LOW (ref 32–36)
MCHC RBC-ENTMCNC: 31.3 GM/DL — LOW (ref 32–36)
MCV RBC AUTO: 83.4 FL — SIGNIFICANT CHANGE UP (ref 80–100)
MCV RBC AUTO: 83.4 FL — SIGNIFICANT CHANGE UP (ref 80–100)
NRBC # BLD: 0 /100 WBCS — SIGNIFICANT CHANGE UP (ref 0–0)
NRBC # BLD: 0 /100 WBCS — SIGNIFICANT CHANGE UP (ref 0–0)
PHOSPHATE SERPL-MCNC: 2.8 MG/DL — SIGNIFICANT CHANGE UP (ref 2.5–4.5)
PHOSPHATE SERPL-MCNC: 2.8 MG/DL — SIGNIFICANT CHANGE UP (ref 2.5–4.5)
PLATELET # BLD AUTO: 283 K/UL — SIGNIFICANT CHANGE UP (ref 150–400)
PLATELET # BLD AUTO: 283 K/UL — SIGNIFICANT CHANGE UP (ref 150–400)
POTASSIUM SERPL-MCNC: 3.6 MMOL/L — SIGNIFICANT CHANGE UP (ref 3.5–5.3)
POTASSIUM SERPL-MCNC: 3.6 MMOL/L — SIGNIFICANT CHANGE UP (ref 3.5–5.3)
POTASSIUM SERPL-SCNC: 3.6 MMOL/L — SIGNIFICANT CHANGE UP (ref 3.5–5.3)
POTASSIUM SERPL-SCNC: 3.6 MMOL/L — SIGNIFICANT CHANGE UP (ref 3.5–5.3)
RBC # BLD: 4.41 M/UL — SIGNIFICANT CHANGE UP (ref 4.2–5.8)
RBC # BLD: 4.41 M/UL — SIGNIFICANT CHANGE UP (ref 4.2–5.8)
RBC # FLD: 22.9 % — HIGH (ref 10.3–14.5)
RBC # FLD: 22.9 % — HIGH (ref 10.3–14.5)
SODIUM SERPL-SCNC: 141 MMOL/L — SIGNIFICANT CHANGE UP (ref 135–145)
SODIUM SERPL-SCNC: 141 MMOL/L — SIGNIFICANT CHANGE UP (ref 135–145)
WBC # BLD: 8.9 K/UL — SIGNIFICANT CHANGE UP (ref 3.8–10.5)
WBC # BLD: 8.9 K/UL — SIGNIFICANT CHANGE UP (ref 3.8–10.5)
WBC # FLD AUTO: 8.9 K/UL — SIGNIFICANT CHANGE UP (ref 3.8–10.5)
WBC # FLD AUTO: 8.9 K/UL — SIGNIFICANT CHANGE UP (ref 3.8–10.5)

## 2023-11-13 RX ORDER — AMLODIPINE BESYLATE 2.5 MG/1
5 TABLET ORAL DAILY
Refills: 0 | Status: DISCONTINUED | OUTPATIENT
Start: 2023-11-13 | End: 2023-11-14

## 2023-11-13 RX ORDER — POTASSIUM CHLORIDE 20 MEQ
20 PACKET (EA) ORAL ONCE
Refills: 0 | Status: COMPLETED | OUTPATIENT
Start: 2023-11-13 | End: 2023-11-13

## 2023-11-13 RX ORDER — SODIUM,POTASSIUM PHOSPHATES 278-250MG
1 POWDER IN PACKET (EA) ORAL ONCE
Refills: 0 | Status: COMPLETED | OUTPATIENT
Start: 2023-11-13 | End: 2023-11-13

## 2023-11-13 RX ADMIN — Medication 20 MILLIEQUIVALENT(S): at 13:30

## 2023-11-13 RX ADMIN — HEPARIN SODIUM 7500 UNIT(S): 5000 INJECTION INTRAVENOUS; SUBCUTANEOUS at 21:15

## 2023-11-13 RX ADMIN — Medication 975 MILLIGRAM(S): at 14:28

## 2023-11-13 RX ADMIN — AMLODIPINE BESYLATE 2.5 MILLIGRAM(S): 2.5 TABLET ORAL at 05:12

## 2023-11-13 RX ADMIN — AMLODIPINE BESYLATE 5 MILLIGRAM(S): 2.5 TABLET ORAL at 14:29

## 2023-11-13 RX ADMIN — Medication 15 MILLIGRAM(S): at 09:40

## 2023-11-13 RX ADMIN — TAMSULOSIN HYDROCHLORIDE 0.4 MILLIGRAM(S): 0.4 CAPSULE ORAL at 21:16

## 2023-11-13 RX ADMIN — Medication 975 MILLIGRAM(S): at 15:28

## 2023-11-13 RX ADMIN — Medication 15 MILLIGRAM(S): at 09:15

## 2023-11-13 RX ADMIN — HEPARIN SODIUM 7500 UNIT(S): 5000 INJECTION INTRAVENOUS; SUBCUTANEOUS at 04:45

## 2023-11-13 RX ADMIN — HEPARIN SODIUM 7500 UNIT(S): 5000 INJECTION INTRAVENOUS; SUBCUTANEOUS at 13:30

## 2023-11-13 RX ADMIN — Medication 1 PACKET(S): at 13:30

## 2023-11-13 NOTE — PHYSICAL THERAPY INITIAL EVALUATION ADULT - GAIT DEVIATIONS NOTED, PT EVAL
decreased priscilla

## 2023-11-13 NOTE — PROGRESS NOTE ADULT - ASSESSMENT
63 year old male with know history of splenic flexure and descending colon adenocarcinoma (Aug 2023 by colonoscopy) know to Dr. Montague, HTN morbid obesity presented after evaluation by PCP due to complaints of dyspepsia, hematochezia, and abdominal pain with bloating; patient was in process of colon cancer evaluation. Now has c/o abdominal pain, BRBPR, hematochezia persists     patient non compliant with anti HTN medications, BP improved on 2.5 mg norvasc daily, can increase to 5 mg with holding parameters (less than 100/60)   pain regiment post op, DVT ppx, anti emetics PRN, ambulate monitor for BMs  normocytic anemia exacerbated post op, afebrile leukocytosis with improvement   CXR unremarkable, labs/ EKG reviewed      patient with METS > 4, no SOB / CP with moderate daily activities   [place] cardiac risk of 0.2%   patient is a class III RCI score risk, 2 points for elevated risk surgery, Q Waves present on EKG

## 2023-11-13 NOTE — PHYSICAL THERAPY INITIAL EVALUATION ADULT - PERTINENT HX OF CURRENT PROBLEM, REHAB EVAL
63 year old male with know history of splenic flexure and descending colon adenocarcinoma (Aug 2023 by colonoscopy) know to Dr. Montague, HTN, HLD, DMII ?, morbid obesity referred to [facility] ED by PCP for increasing abdominal pain, bloody bowel movements, increasing abdominal bloating and distension. Patient refers that over the past couple of days he has been experiencing crampy lower abdominal pain with increasing feeling of bloating. Refers that he was being worked up for colon malignancy after he started to notice blood on the toilet bowl after bowel movements and while wiping. Today he refers intermittent, dull lower abdominal pain not associated with nausea, vomiting, blood per rectum, melena. Denies any chest pain, shortness of breathe, cough, urinary symptoms.

## 2023-11-13 NOTE — PROGRESS NOTE ADULT - SUBJECTIVE AND OBJECTIVE BOX
63 year old male with know history of splenic flexure and descending colon adenocarcinoma (Aug 2023 by colonoscopy) know to Dr. Montague, HTN, HLD, DMII, morbid obesity presented after evaluation by PCP due to complaints of dyspepsia, hematochezia, and abdominal pain with bloating; patient was in process of colon cancer evaluation. Now has c/o abdominal pain, BRBPR, hematochezia persists, denies current N/V. Patient denies history of HLD DMII or CAD   OR today for hemicolectomy     patient seen and examined at bedside  passing gas, ambulating, neg BMs   c/o lower abdominal pain upon ambulation   dressings CDI   attributes HTN episodes due to pain     ALL 12 SYSTEMS Reviewed and negative except for HPI      Vital Signs Last 24 Hrs  T(C): 36.3 (13 Nov 2023 08:58), Max: 36.9 (12 Nov 2023 16:37)  T(F): 97.4 (13 Nov 2023 08:58), Max: 98.4 (12 Nov 2023 16:37)  HR: 71 (13 Nov 2023 08:58) (70 - 86)  BP: 160/79 (13 Nov 2023 08:58) (126/69 - 160/79)  BP(mean): 106 (13 Nov 2023 08:58) (106 - 106)  RR: 17 (13 Nov 2023 08:58) (17 - 18)  SpO2: 97% (13 Nov 2023 08:58) (94% - 97%)    Parameters below as of 13 Nov 2023 08:58  Patient On (Oxygen Delivery Method): room air      MEDICATIONS  (STANDING):  amLODIPine   Tablet 2.5 milliGRAM(s) Oral daily  dextrose 5%. 1000 milliLiter(s) (50 mL/Hr) IV Continuous <Continuous>  dextrose 5%. 1000 milliLiter(s) (100 mL/Hr) IV Continuous <Continuous>  dextrose 50% Injectable 12.5 Gram(s) IV Push once  dextrose 50% Injectable 25 Gram(s) IV Push once  dextrose 50% Injectable 25 Gram(s) IV Push once  glucagon  Injectable 1 milliGRAM(s) IntraMuscular once  heparin   Injectable 7500 Unit(s) SubCutaneous every 8 hours  influenza   Vaccine 0.5 milliLiter(s) IntraMuscular once  insulin lispro (ADMELOG) corrective regimen sliding scale   SubCutaneous three times a day before meals  insulin lispro (ADMELOG) corrective regimen sliding scale   SubCutaneous at bedtime  potassium chloride   Powder 20 milliEquivalent(s) Oral once  potassium chloride  10 mEq/100 mL IVPB 10 milliEquivalent(s) IV Intermittent every 1 hour  potassium phosphate / sodium phosphate Powder (PHOS-NaK) 1 Packet(s) Oral once  tamsulosin 0.4 milliGRAM(s) Oral at bedtime    MEDICATIONS  (PRN):  acetaminophen     Tablet .. 975 milliGRAM(s) Oral every 6 hours PRN Mild Pain (1 - 3)  dextrose Oral Gel 15 Gram(s) Oral once PRN Blood Glucose LESS THAN 70 milliGRAM(s)/deciliter  HYDROmorphone  Injectable 0.5 milliGRAM(s) IV Push every 30 minutes PRN breakthrough pain in the PACU  HYDROmorphone  Injectable 0.5 milliGRAM(s) IV Push every 4 hours PRN Severe Pain (7 - 10)  HYDROmorphone  Injectable 0.25 milliGRAM(s) IV Push every 4 hours PRN Moderate Pain (4 - 6)  ketorolac   Injectable 15 milliGRAM(s) IV Push every 6 hours PRN Moderate Pain (4 - 6)  ondansetron Injectable 4 milliGRAM(s) IV Push every 8 hours PRN Nausea and/or Vomiting    I&O's Summary    07 Nov 2023 07:01  -  08 Nov 2023 07:00  --------------------------------------------------------  IN: 0 mL / OUT: 650 mL / NET: -650 mL    08 Nov 2023 07:01  -  08 Nov 2023 14:28  --------------------------------------------------------  IN: 230 mL / OUT: 0 mL / NET: 230 mL        LABS:                        12.6   7.60  )-----------( 282      ( 08 Nov 2023 05:30 )             40.1     11-08    140  |  107  |  8   ----------------------------<  107<H>  4.2   |  23  |  1.00    Ca    8.4      08 Nov 2023 05:30  Phos  3.4     11-08  Mg     2.4     11-08    TPro  6.9  /  Alb  3.5  /  TBili  0.2  /  DBili  x   /  AST  19  /  ALT  20  /  AlkPhos  82  11-07    LIVER FUNCTIONS - ( 07 Nov 2023 13:48 )  Alb: 3.5 g/dL / Pro: 6.9 g/dL / ALK PHOS: 82 U/L / ALT: 20 U/L / AST: 19 U/L / GGT: x           PT/INR - ( 07 Nov 2023 12:13 )   PT: 11.8 sec;   INR: 1.04          PTT - ( 07 Nov 2023 12:13 )  PTT:29.3 sec  Urinalysis Basic - ( 08 Nov 2023 05:30 )    Color: x / Appearance: x / SG: x / pH: x  Gluc: 107 mg/dL / Ketone: x  / Bili: x / Urobili: x   Blood: x / Protein: x / Nitrite: x   Leuk Esterase: x / RBC: x / WBC x   Sq Epi: x / Non Sq Epi: x / Bacteria: x      CAPILLARY BLOOD GLUCOSE      POCT Blood Glucose.: 98 mg/dL (08 Nov 2023 11:44)  POCT Blood Glucose.: 91 mg/dL (08 Nov 2023 06:07)  POCT Blood Glucose.: 109 mg/dL (07 Nov 2023 21:33)  POCT Blood Glucose.: 97 mg/dL (07 Nov 2023 18:02)      Cultures:      PHYSICAL EXAM:  General: NAD, resting comfortably  HEENT: NC/AT, EOMI, normal hearing, no oral lesions, no LAD, neck supple  Pulmonary: Normal resp effort, CTA-B  Cardiovascular: NSR, no murmurs  Abdominal: Soft, ND/NT, no organomegaly  Groin: Soft, nontender, no ecchymosis/hematoma, no erythema, no edema.  Extremities: (+) DP/PT pulses. FROM, normal strength, no clubbing/cyanosis/erythema/edema  Neuro: A/O x 3, CNs II-XII grossly intact, normal sensation, no focal deficits  Pulses: Palpable distal pulses    RADIOLOGY & ADDITIONAL STUDIES:

## 2023-11-13 NOTE — PROGRESS NOTE ADULT - SUBJECTIVE AND OBJECTIVE BOX
11/13: K and phos repleated.   O/N: duke residue diet , +bf  11/12: Advance to low residue. Hb 10.8. Mg, K and Phos repleted. PT ordered    POST-OP DAY: 4 s/p Extended L hemicolectomy (11/9)     SUBJECTIVE: Patient seen and examined bedside by Surgical resident. States that he has mild pain with standing, +f/-bms. States that his drainage has decreased in amount. Tolerating PO without n/v. Was able to get out of bed and ambulate some, was encouraged to continue to ambulate more.     amLODIPine   Tablet 2.5 milliGRAM(s) Oral daily  heparin   Injectable 7500 Unit(s) SubCutaneous every 8 hours    MEDICATIONS  (PRN):  acetaminophen     Tablet .. 975 milliGRAM(s) Oral every 6 hours PRN Mild Pain (1 - 3)  dextrose Oral Gel 15 Gram(s) Oral once PRN Blood Glucose LESS THAN 70 milliGRAM(s)/deciliter  HYDROmorphone  Injectable 0.5 milliGRAM(s) IV Push every 30 minutes PRN breakthrough pain in the PACU  HYDROmorphone  Injectable 0.5 milliGRAM(s) IV Push every 4 hours PRN Severe Pain (7 - 10)  HYDROmorphone  Injectable 0.25 milliGRAM(s) IV Push every 4 hours PRN Moderate Pain (4 - 6)  ketorolac   Injectable 15 milliGRAM(s) IV Push every 6 hours PRN Moderate Pain (4 - 6)  ondansetron Injectable 4 milliGRAM(s) IV Push every 8 hours PRN Nausea and/or Vomiting      I&O's Detail    12 Nov 2023 07:01  -  13 Nov 2023 07:00  --------------------------------------------------------  IN:    IV PiggyBack: 50 mL  Total IN: 50 mL    OUT:    Voided (mL): 1450 mL  Total OUT: 1450 mL    Total NET: -1400 mL          Vital Signs Last 24 Hrs  T(C): 36.3 (13 Nov 2023 08:58), Max: 36.9 (12 Nov 2023 16:37)  T(F): 97.4 (13 Nov 2023 08:58), Max: 98.4 (12 Nov 2023 16:37)  HR: 71 (13 Nov 2023 08:58) (70 - 86)  BP: 160/79 (13 Nov 2023 08:58) (126/69 - 160/79)  BP(mean): 106 (13 Nov 2023 08:58) (106 - 106)  RR: 17 (13 Nov 2023 08:58) (17 - 18)  SpO2: 97% (13 Nov 2023 08:58) (94% - 97%)    Parameters below as of 13 Nov 2023 08:58  Patient On (Oxygen Delivery Method): room air        General: NAD, resting comfortably in bed  C/V: NSR  Pulm: Nonlabored breathing, no respiratory distress  Abd: soft, NT/ND. midline incision has some moderate erythema at the skin edges. port incisions c/d/i, dressings changed.   Extrem: WWP, no edema, SCDs in place    LABS:                        11.5   8.90  )-----------( 283      ( 13 Nov 2023 05:30 )             36.8     11-13    141  |  105  |  12  ----------------------------<  115<H>  3.6   |  23  |  0.93    Ca    8.2<L>      13 Nov 2023 05:30  Phos  2.8     11-13  Mg     2.1     11-13        Urinalysis Basic - ( 13 Nov 2023 05:30 )    Color: x / Appearance: x / SG: x / pH: x  Gluc: 115 mg/dL / Ketone: x  / Bili: x / Urobili: x   Blood: x / Protein: x / Nitrite: x   Leuk Esterase: x / RBC: x / WBC x   Sq Epi: x / Non Sq Epi: x / Bacteria: x        RADIOLOGY & ADDITIONAL STUDIES:

## 2023-11-13 NOTE — PROGRESS NOTE ADULT - ASSESSMENT
62yo male with known hx of splenic flexure and descending colon adenocarcinoma (8/2023 by colonoscopy), HTN, HLD, DMII, morbid obesity referred to [facility] ED by PCP for increasing abdominal pain, bloody bowel movements, increasing abdominal bloating and distension. Patient refers that over the past couple of days he has been experiencing crampy lower abdominal pain with increasing feeling of bloating. States he was being worked up for colon malignancy after he started to notice blood on the toilet after bowel movements and while wiping. Today he refers intermittent, dull lower abdominal pain not associated with nausea, vomiting, blood per rectum, melena. S/p Extended L hemicolectomy (11/9)    Low residue diet /IVF  Pain/nausea control prn  Penrose  SQH/SCDs  IS/OOBA  ISS  AM labs  f/u Urology recs

## 2023-11-13 NOTE — PHYSICAL THERAPY INITIAL EVALUATION ADULT - ADDITIONAL COMMENTS
Pt reports to live with Spouse in a 4 flr walk up but will be staying with daughter post d/c in a private home with no LUIS. Pt reports to be indpt with all ADls and IADLs prior to admission.

## 2023-11-14 VITALS — HEART RATE: 88 BPM

## 2023-11-14 LAB
ANION GAP SERPL CALC-SCNC: 12 MMOL/L — SIGNIFICANT CHANGE UP (ref 5–17)
ANION GAP SERPL CALC-SCNC: 12 MMOL/L — SIGNIFICANT CHANGE UP (ref 5–17)
BUN SERPL-MCNC: 10 MG/DL — SIGNIFICANT CHANGE UP (ref 7–23)
BUN SERPL-MCNC: 10 MG/DL — SIGNIFICANT CHANGE UP (ref 7–23)
CALCIUM SERPL-MCNC: 8.4 MG/DL — SIGNIFICANT CHANGE UP (ref 8.4–10.5)
CALCIUM SERPL-MCNC: 8.4 MG/DL — SIGNIFICANT CHANGE UP (ref 8.4–10.5)
CHLORIDE SERPL-SCNC: 103 MMOL/L — SIGNIFICANT CHANGE UP (ref 96–108)
CHLORIDE SERPL-SCNC: 103 MMOL/L — SIGNIFICANT CHANGE UP (ref 96–108)
CO2 SERPL-SCNC: 23 MMOL/L — SIGNIFICANT CHANGE UP (ref 22–31)
CO2 SERPL-SCNC: 23 MMOL/L — SIGNIFICANT CHANGE UP (ref 22–31)
CREAT SERPL-MCNC: 0.9 MG/DL — SIGNIFICANT CHANGE UP (ref 0.5–1.3)
CREAT SERPL-MCNC: 0.9 MG/DL — SIGNIFICANT CHANGE UP (ref 0.5–1.3)
EGFR: 96 ML/MIN/1.73M2 — SIGNIFICANT CHANGE UP
EGFR: 96 ML/MIN/1.73M2 — SIGNIFICANT CHANGE UP
GLUCOSE BLDC GLUCOMTR-MCNC: 111 MG/DL — HIGH (ref 70–99)
GLUCOSE BLDC GLUCOMTR-MCNC: 111 MG/DL — HIGH (ref 70–99)
GLUCOSE BLDC GLUCOMTR-MCNC: 133 MG/DL — HIGH (ref 70–99)
GLUCOSE BLDC GLUCOMTR-MCNC: 133 MG/DL — HIGH (ref 70–99)
GLUCOSE SERPL-MCNC: 105 MG/DL — HIGH (ref 70–99)
GLUCOSE SERPL-MCNC: 105 MG/DL — HIGH (ref 70–99)
HCT VFR BLD CALC: 37.1 % — LOW (ref 39–50)
HCT VFR BLD CALC: 37.1 % — LOW (ref 39–50)
HGB BLD-MCNC: 11.5 G/DL — LOW (ref 13–17)
HGB BLD-MCNC: 11.5 G/DL — LOW (ref 13–17)
MAGNESIUM SERPL-MCNC: 2 MG/DL — SIGNIFICANT CHANGE UP (ref 1.6–2.6)
MAGNESIUM SERPL-MCNC: 2 MG/DL — SIGNIFICANT CHANGE UP (ref 1.6–2.6)
MCHC RBC-ENTMCNC: 25.8 PG — LOW (ref 27–34)
MCHC RBC-ENTMCNC: 25.8 PG — LOW (ref 27–34)
MCHC RBC-ENTMCNC: 31 GM/DL — LOW (ref 32–36)
MCHC RBC-ENTMCNC: 31 GM/DL — LOW (ref 32–36)
MCV RBC AUTO: 83.2 FL — SIGNIFICANT CHANGE UP (ref 80–100)
MCV RBC AUTO: 83.2 FL — SIGNIFICANT CHANGE UP (ref 80–100)
NRBC # BLD: 0 /100 WBCS — SIGNIFICANT CHANGE UP (ref 0–0)
NRBC # BLD: 0 /100 WBCS — SIGNIFICANT CHANGE UP (ref 0–0)
PHOSPHATE SERPL-MCNC: 3 MG/DL — SIGNIFICANT CHANGE UP (ref 2.5–4.5)
PHOSPHATE SERPL-MCNC: 3 MG/DL — SIGNIFICANT CHANGE UP (ref 2.5–4.5)
PLATELET # BLD AUTO: 312 K/UL — SIGNIFICANT CHANGE UP (ref 150–400)
PLATELET # BLD AUTO: 312 K/UL — SIGNIFICANT CHANGE UP (ref 150–400)
POTASSIUM SERPL-MCNC: 3.6 MMOL/L — SIGNIFICANT CHANGE UP (ref 3.5–5.3)
POTASSIUM SERPL-MCNC: 3.6 MMOL/L — SIGNIFICANT CHANGE UP (ref 3.5–5.3)
POTASSIUM SERPL-SCNC: 3.6 MMOL/L — SIGNIFICANT CHANGE UP (ref 3.5–5.3)
POTASSIUM SERPL-SCNC: 3.6 MMOL/L — SIGNIFICANT CHANGE UP (ref 3.5–5.3)
RBC # BLD: 4.46 M/UL — SIGNIFICANT CHANGE UP (ref 4.2–5.8)
RBC # BLD: 4.46 M/UL — SIGNIFICANT CHANGE UP (ref 4.2–5.8)
RBC # FLD: 22.2 % — HIGH (ref 10.3–14.5)
RBC # FLD: 22.2 % — HIGH (ref 10.3–14.5)
SODIUM SERPL-SCNC: 138 MMOL/L — SIGNIFICANT CHANGE UP (ref 135–145)
SODIUM SERPL-SCNC: 138 MMOL/L — SIGNIFICANT CHANGE UP (ref 135–145)
WBC # BLD: 8.78 K/UL — SIGNIFICANT CHANGE UP (ref 3.8–10.5)
WBC # BLD: 8.78 K/UL — SIGNIFICANT CHANGE UP (ref 3.8–10.5)
WBC # FLD AUTO: 8.78 K/UL — SIGNIFICANT CHANGE UP (ref 3.8–10.5)
WBC # FLD AUTO: 8.78 K/UL — SIGNIFICANT CHANGE UP (ref 3.8–10.5)

## 2023-11-14 PROCEDURE — 88341 IMHCHEM/IMCYTCHM EA ADD ANTB: CPT

## 2023-11-14 PROCEDURE — 88342 IMHCHEM/IMCYTCHM 1ST ANTB: CPT

## 2023-11-14 PROCEDURE — 84100 ASSAY OF PHOSPHORUS: CPT

## 2023-11-14 PROCEDURE — 83735 ASSAY OF MAGNESIUM: CPT

## 2023-11-14 PROCEDURE — 86901 BLOOD TYPING SEROLOGIC RH(D): CPT

## 2023-11-14 PROCEDURE — 85610 PROTHROMBIN TIME: CPT

## 2023-11-14 PROCEDURE — 71045 X-RAY EXAM CHEST 1 VIEW: CPT

## 2023-11-14 PROCEDURE — 85027 COMPLETE CBC AUTOMATED: CPT

## 2023-11-14 PROCEDURE — 93005 ELECTROCARDIOGRAM TRACING: CPT

## 2023-11-14 PROCEDURE — 80048 BASIC METABOLIC PNL TOTAL CA: CPT

## 2023-11-14 PROCEDURE — 82962 GLUCOSE BLOOD TEST: CPT

## 2023-11-14 PROCEDURE — 88304 TISSUE EXAM BY PATHOLOGIST: CPT

## 2023-11-14 PROCEDURE — 80053 COMPREHEN METABOLIC PANEL: CPT

## 2023-11-14 PROCEDURE — 85025 COMPLETE CBC W/AUTO DIFF WBC: CPT

## 2023-11-14 PROCEDURE — 86850 RBC ANTIBODY SCREEN: CPT

## 2023-11-14 PROCEDURE — C9399: CPT

## 2023-11-14 PROCEDURE — 86900 BLOOD TYPING SEROLOGIC ABO: CPT

## 2023-11-14 PROCEDURE — 85730 THROMBOPLASTIN TIME PARTIAL: CPT

## 2023-11-14 PROCEDURE — 83036 HEMOGLOBIN GLYCOSYLATED A1C: CPT

## 2023-11-14 PROCEDURE — 36415 COLL VENOUS BLD VENIPUNCTURE: CPT

## 2023-11-14 PROCEDURE — 97161 PT EVAL LOW COMPLEX 20 MIN: CPT

## 2023-11-14 PROCEDURE — 83690 ASSAY OF LIPASE: CPT

## 2023-11-14 PROCEDURE — C1889: CPT

## 2023-11-14 PROCEDURE — 88309 TISSUE EXAM BY PATHOLOGIST: CPT

## 2023-11-14 PROCEDURE — 99285 EMERGENCY DEPT VISIT HI MDM: CPT

## 2023-11-14 RX ORDER — POTASSIUM CHLORIDE 20 MEQ
40 PACKET (EA) ORAL ONCE
Refills: 0 | Status: COMPLETED | OUTPATIENT
Start: 2023-11-14 | End: 2023-11-14

## 2023-11-14 RX ORDER — AMLODIPINE BESYLATE 2.5 MG/1
1 TABLET ORAL
Qty: 30 | Refills: 0
Start: 2023-11-14 | End: 2023-12-13

## 2023-11-14 RX ORDER — AMLODIPINE BESYLATE 2.5 MG/1
1 TABLET ORAL
Qty: 0 | Refills: 0 | DISCHARGE
Start: 2023-11-14

## 2023-11-14 RX ADMIN — Medication 975 MILLIGRAM(S): at 15:42

## 2023-11-14 RX ADMIN — HEPARIN SODIUM 7500 UNIT(S): 5000 INJECTION INTRAVENOUS; SUBCUTANEOUS at 05:47

## 2023-11-14 RX ADMIN — AMLODIPINE BESYLATE 5 MILLIGRAM(S): 2.5 TABLET ORAL at 05:47

## 2023-11-14 RX ADMIN — Medication 975 MILLIGRAM(S): at 14:42

## 2023-11-14 RX ADMIN — HEPARIN SODIUM 7500 UNIT(S): 5000 INJECTION INTRAVENOUS; SUBCUTANEOUS at 14:42

## 2023-11-14 RX ADMIN — Medication 40 MILLIEQUIVALENT(S): at 12:17

## 2023-11-14 NOTE — DISCHARGE NOTE NURSING/CASE MANAGEMENT/SOCIAL WORK - PATIENT PORTAL LINK FT
You can access the FollowMyHealth Patient Portal offered by [facility] by registering at the following website: [email or web address]. By joining [facility]’s FollowMyHealth portal, you will also be able to view your health information using other applications (apps) compatible with our system.

## 2023-11-14 NOTE — PROGRESS NOTE ADULT - ASSESSMENT
62yo male with known hx of splenic flexure and descending colon adenocarcinoma (8/2023 by colonoscopy), HTN, HLD, DMII, morbid obesity referred to [facility] ED by PCP for increasing abdominal pain, bloody bowel movements, increasing abdominal bloating and distension. Patient refers that over the past couple of days he has been experiencing crampy lower abdominal pain with increasing feeling of bloating. States he was being worked up for colon malignancy after he started to notice blood on the toilet after bowel movements and while wiping. Today he refers intermittent, dull lower abdominal pain not associated with nausea, vomiting, blood per rectum, melena. S/p Extended L hemicolectomy (11/9)    Low residue diet /IVF  Pain/nausea control prn  Penrose  SQH/SCDs  IS/OOBA  ISS  AM labs  f/u Urology recs  Dispo: Rolling walker and PT scrips , d/c to home today.

## 2023-11-14 NOTE — PROGRESS NOTE ADULT - PROVIDER SPECIALTY LIST ADULT
Internal Medicine
Surgery
Internal Medicine
Internal Medicine
Surgery
Internal Medicine
Surgery

## 2023-11-14 NOTE — PROGRESS NOTE ADULT - ASSESSMENT
63 year old male with know history of splenic flexure and descending colon adenocarcinoma (Aug 2023 by colonoscopy) know to Dr. Montague, HTN, HLD, DMII, morbid obesity presented after evaluation by PCP due to complaints of dyspepsia, hematochezia, and abdominal pain with bloating; patient was in process of colon cancer evaluation and now s/p hemicolectomy. Patient is passing gas and only complains of mild abdominal pain, as expected. Patient has been noncompliant with his anti-hypertensive meds and was increased to 5mg of Norvasc. Pain regiment post op, DVT ppx, anti emetics PRN, ambulate monitor for BMs. Normocytic anemia exacerbated post op but afebrile and leukocytosis with improvement. Patient to follow up with surgery and PCP once discharged to optimize medical management.    Thank you for this consultation.

## 2023-11-14 NOTE — DISCHARGE NOTE NURSING/CASE MANAGEMENT/SOCIAL WORK - NSDCPNINST_GEN_ALL_CORE
Call Dr. Montague if you have any questions or concerns. Continue wearing your abdominal binder as placed by your doctor for comfort. Educational material given- [employer] Patient Education: Surgical Wound Discharge Instructions.

## 2023-11-14 NOTE — PROGRESS NOTE ADULT - SUBJECTIVE AND OBJECTIVE BOX
11/14: penrose drain removed.   O/N: MELLISA +f/+bm  11/13: K and phos repleated. Amilodipine increased from 2.5 to 5mg daily. /80. Rolling walker for home script in chart.      POST-OP DAY: S/p Extended L hemicolectomy (11/9)       SUBJECTIVE: Patient seen and examined bedside by Surgical resident. States that he is having no difficulty with PO intake, -n/v, +ROBF with +Bms x2 yesterday, latest one with no clotted blood and only fecal material. Abd binder was readjusted and was shown the proper position for the binder. Denies any fevers, chills, SOB, chest pains this am.     amLODIPine   Tablet 5 milliGRAM(s) Oral daily  heparin   Injectable 7500 Unit(s) SubCutaneous every 8 hours    MEDICATIONS  (PRN):  acetaminophen     Tablet .. 975 milliGRAM(s) Oral every 6 hours PRN Mild Pain (1 - 3)  dextrose Oral Gel 15 Gram(s) Oral once PRN Blood Glucose LESS THAN 70 milliGRAM(s)/deciliter  HYDROmorphone  Injectable 0.5 milliGRAM(s) IV Push every 30 minutes PRN breakthrough pain in the PACU  HYDROmorphone  Injectable 0.5 milliGRAM(s) IV Push every 4 hours PRN Severe Pain (7 - 10)  HYDROmorphone  Injectable 0.25 milliGRAM(s) IV Push every 4 hours PRN Moderate Pain (4 - 6)  ketorolac   Injectable 15 milliGRAM(s) IV Push every 6 hours PRN Moderate Pain (4 - 6)  ondansetron Injectable 4 milliGRAM(s) IV Push every 8 hours PRN Nausea and/or Vomiting      I&O's Detail    13 Nov 2023 07:01  -  14 Nov 2023 07:00  --------------------------------------------------------  IN:    Oral Fluid: 1320 mL  Total IN: 1320 mL    OUT:    Voided (mL): 1700 mL  Total OUT: 1700 mL    Total NET: -380 mL          Vital Signs Last 24 Hrs  T(C): 36.8 (14 Nov 2023 05:30), Max: 37.3 (13 Nov 2023 20:10)  T(F): 98.2 (14 Nov 2023 05:30), Max: 99.2 (13 Nov 2023 20:10)  HR: 71 (14 Nov 2023 05:30) (70 - 86)  BP: 148/78 (14 Nov 2023 05:30) (142/77 - 165/70)  BP(mean): 101 (14 Nov 2023 05:30) (101 - 106)  RR: 17 (14 Nov 2023 05:30) (17 - 18)  SpO2: 98% (14 Nov 2023 05:30) (95% - 98%)    Parameters below as of 14 Nov 2023 05:30  Patient On (Oxygen Delivery Method): room air        General: NAD, resting comfortably in bed  C/V: NSR  Pulm: Nonlabored breathing, no respiratory distress  Abd: soft, NT/ND, midline incision c/d/i, minimal erythema at the skin edges. remains nontender.   Extrem: WWP, no edema, SCDs in place    LABS:                        11.5   8.78  )-----------( 312      ( 14 Nov 2023 05:30 )             37.1     11-13    141  |  105  |  12  ----------------------------<  115<H>  3.6   |  23  |  0.93    Ca    8.2<L>      13 Nov 2023 05:30  Phos  2.8     11-13  Mg     2.1     11-13        Urinalysis Basic - ( 13 Nov 2023 05:30 )    Color: x / Appearance: x / SG: x / pH: x  Gluc: 115 mg/dL / Ketone: x  / Bili: x / Urobili: x   Blood: x / Protein: x / Nitrite: x   Leuk Esterase: x / RBC: x / WBC x   Sq Epi: x / Non Sq Epi: x / Bacteria: x        RADIOLOGY & ADDITIONAL STUDIES:

## 2023-11-14 NOTE — PROGRESS NOTE ADULT - REASON FOR ADMISSION
Colonic adenocarcinoma/ obstructive symptoms

## 2023-11-14 NOTE — PROGRESS NOTE ADULT - SUBJECTIVE AND OBJECTIVE BOX
63 year old male with know history of splenic flexure and descending colon adenocarcinoma (Aug 2023 by colonoscopy) know to Dr. Montague, HTN, HLD, DMII, morbid obesity presented after evaluation by PCP due to complaints of dyspepsia, hematochezia, and abdominal pain with bloating; patient was in process of colon cancer evaluation and now s/p hemicolectomy. Patient is passing gas and only complains of mild abdominal pain, as expected.    PAST MEDICAL/SURGICAL HISTORY  PAST MEDICAL & SURGICAL HISTORY:  Malignant neoplasm of colon  HTN (hypertension)  DMII (diabetes mellitus, type 2)  CAD (coronary artery disease)  HLD (hyperlipidemia)  No significant past surgical history    FAMILY HISTORY  FAMILY HISTORY:  No pertinent family history in first degree relatives      MEDICATIONS  (STANDING):  amLODIPine   Tablet 5 milliGRAM(s) Oral daily  dextrose 5%. 1000 milliLiter(s) (50 mL/Hr) IV Continuous <Continuous>  dextrose 5%. 1000 milliLiter(s) (100 mL/Hr) IV Continuous <Continuous>  dextrose 50% Injectable 12.5 Gram(s) IV Push once  dextrose 50% Injectable 25 Gram(s) IV Push once  dextrose 50% Injectable 25 Gram(s) IV Push once  glucagon  Injectable 1 milliGRAM(s) IntraMuscular once  heparin   Injectable 7500 Unit(s) SubCutaneous every 8 hours  influenza   Vaccine 0.5 milliLiter(s) IntraMuscular once  insulin lispro (ADMELOG) corrective regimen sliding scale   SubCutaneous three times a day before meals  insulin lispro (ADMELOG) corrective regimen sliding scale   SubCutaneous at bedtime  potassium chloride  10 mEq/100 mL IVPB 10 milliEquivalent(s) IV Intermittent every 1 hour  tamsulosin 0.4 milliGRAM(s) Oral at bedtime    MEDICATIONS  (PRN):  acetaminophen     Tablet .. 975 milliGRAM(s) Oral every 6 hours PRN Mild Pain (1 - 3)  dextrose Oral Gel 15 Gram(s) Oral once PRN Blood Glucose LESS THAN 70 milliGRAM(s)/deciliter  HYDROmorphone  Injectable 0.25 milliGRAM(s) IV Push every 4 hours PRN Moderate Pain (4 - 6)  HYDROmorphone  Injectable 0.5 milliGRAM(s) IV Push every 30 minutes PRN breakthrough pain in the PACU  HYDROmorphone  Injectable 0.5 milliGRAM(s) IV Push every 4 hours PRN Severe Pain (7 - 10)  ondansetron Injectable 4 milliGRAM(s) IV Push every 8 hours PRN Nausea and/or Vomiting    T(C): 36.4 (11-14-23 @ 12:41), Max: 37.3 (11-13-23 @ 20:10)  HR: 73 (11-14-23 @ 12:41) (70 - 86)  BP: 144/80 (11-14-23 @ 12:41) (142/77 - 165/70)  RR: 17 (11-14-23 @ 12:41) (17 - 18)  SpO2: 96% (11-14-23 @ 12:41) (95% - 98%)  Wt(kg): --Vital Signs Last 24 Hrs  T(C): 36.4 (14 Nov 2023 12:41), Max: 37.3 (13 Nov 2023 20:10)  T(F): 97.6 (14 Nov 2023 12:41), Max: 99.2 (13 Nov 2023 20:10)  HR: 73 (14 Nov 2023 12:41) (70 - 86)  BP: 144/80 (14 Nov 2023 12:41) (142/77 - 165/70)  BP(mean): 102 (14 Nov 2023 08:50) (101 - 102)  RR: 17 (14 Nov 2023 12:41) (17 - 18)  SpO2: 96% (14 Nov 2023 12:41) (95% - 98%)    Parameters below as of 14 Nov 2023 12:41  Patient On (Oxygen Delivery Method): room air      Oxygen Saturation Index= Unable to calculate   [Based on FiO2 = Unknown, SpO2 = 96(11/14/2023 12:41), MAP = Unknown]  Daily     Daily     PHYSICAL EXAM:  General: NAD, resting comfortably  HEENT: NC/AT, EOMI  Pulmonary: Normal resp effort, CTAB  Cardiovascular: NSR, no murmurs  Abdominal: Soft, ND/NT, no organomegaly; abdominal binder  Groin: Soft, nontender, no ecchymosis/hematoma, no erythema, no edema.    CAPILLARY BLOOD GLUCOSE      POCT Blood Glucose.: 133 mg/dL (14 Nov 2023 12:15)  POCT Blood Glucose.: 111 mg/dL (14 Nov 2023 06:28)  POCT Blood Glucose.: 105 mg/dL (13 Nov 2023 22:16)  POCT Blood Glucose.: 101 mg/dL (13 Nov 2023 17:36)      LABS:  CBC   11-14-23 @ 05:30  Hematcorit 37.1  Hemoglobin 11.5  Mean Cell Hemoglobin 25.8  Platelet Count-Automated 312  RBC Count 4.46  Red Cell Distrib Width 22.2  Wbc Count 8.78    BMP  11-14-23 @ 05:30  Anion Gap. Serum 12  Blood Urea Nitrogen,Serm 10  Calcium, Total Serum 8.4  Carbon Dioxide, Serum 23  Chloride, Serum 103  Creatinine, Serum 0.90  eGFR in  --  eGFR in Non Afican American --  Gloucose, serum 105  Potassium, Serum 3.6  Sodium, Serum 138    11-13-23 @ 05:30  Anion Gap. Serum 13  Blood Urea Nitrogen,Serm 12  Calcium, Total Serum 8.2  Carbon Dioxide, Serum 23  Chloride, Serum 105  Creatinine, Serum 0.93  eGFR in  --  eGFR in Non Afican American --  Gloucose, serum 115  Potassium, Serum 3.6  Sodium, Serum 141    11-12-23 @ 07:24  Anion Gap. Serum 11  Blood Urea Nitrogen,Serm 10  Calcium, Total Serum 8.3  Carbon Dioxide, Serum 23  Chloride, Serum 104  Creatinine, Serum 0.89  eGFR in  --  eGFR in Non Afican American --  Gloucose, serum 100  Potassium, Serum 3.5  Sodium, Serum 138    CMP  11-14-23 @ 05:30  Veronica Aminotransferase(ALT/SGPT)--  Albumin, Serum --  Alkaline Phosphatase, Serum --  Anion Gap, Serum 12  Aspartate Aminotransferase (AST/SGOT)--  Bilirubin Total, Serum --  Blood Urea Nitrogen, Serum 10  Calcium,Total Serum 8.4  Carbon Dioxide, Serum 23  Chloride, Serum 103  Creatinine, Serum 0.90  eGFR if  --  eGFR if Non African American --  Glucose, Serum 105  Potassium, Serum 3.6  Protein Total, Serum --  Sodium, Serum 138    11-13-23 @ 05:30  Veronica Aminotransferase(ALT/SGPT)--  Albumin, Serum --  Alkaline Phosphatase, Serum --  Anion Gap, Serum 13  Aspartate Aminotransferase (AST/SGOT)--  Bilirubin Total, Serum --  Blood Urea Nitrogen, Serum 12  Calcium,Total Serum 8.2  Carbon Dioxide, Serum 23  Chloride, Serum 105  Creatinine, Serum 0.93  eGFR if  --  eGFR if Non African American --  Glucose, Serum 115  Potassium, Serum 3.6  Protein Total, Serum --  Sodium, Serum 141    11-12-23 @ 07:24  Veronica Aminotransferase(ALT/SGPT)--  Albumin, Serum --  Alkaline Phosphatase, Serum --  Anion Gap, Serum 11  Aspartate Aminotransferase (AST/SGOT)--  Bilirubin Total, Serum --  Blood Urea Nitrogen, Serum 10  Calcium,Total Serum 8.3  Carbon Dioxide, Serum 23  Chloride, Serum 104  Creatinine, Serum 0.89  eGFR if  --  eGFR if Non African American --  Glucose, Serum 100  Potassium, Serum 3.5  Protein Total, Serum --  Sodium, Serum 138

## 2023-11-22 LAB
SURGICAL PATHOLOGY STUDY: SIGNIFICANT CHANGE UP
SURGICAL PATHOLOGY STUDY: SIGNIFICANT CHANGE UP

## 2025-02-13 NOTE — PRE-ANESTHESIA EVALUATION ADULT - NSANTHOSAYNRD_GEN_A_CORE
What Is The Reason For Today's Visit?: History of Non-Melanoma Skin Cancer
How Many Skin Cancers Have You Had?: more than one
When Was Your Last Cancer Diagnosed?: 2024
No. JAIME screening performed.  STOP BANG Legend: 0-2 = LOW Risk; 3-4 = INTERMEDIATE Risk; 5-8 = HIGH Risk
